# Patient Record
Sex: MALE | Race: WHITE | Employment: OTHER | URBAN - METROPOLITAN AREA
[De-identification: names, ages, dates, MRNs, and addresses within clinical notes are randomized per-mention and may not be internally consistent; named-entity substitution may affect disease eponyms.]

---

## 2017-01-06 NOTE — PROGRESS NOTES
SUBJECTIVE:                                                    Bhaskar Carrillo is a 76 year old male who presents to clinic today for the following health issues:      Hyperlipidemia Follow-Up-- he refuses meds due to side effects, he does not want to try anything else if there is even a remote chance of same side effects .      Rate your low fat/cholesterol diet?: fair    Taking statin?  No    Other lipid medications/supplements?:  none     COPD Follow-Up    Symptoms are currently: stable, is not needing that as much anymore, uses it 3-4 times daily now still declining other inhalers     Current fatigue or dyspnea with ambulation: stable     Shortness of breath: stable    Increased or change in Cough/Sputum: No    Fever(s): No    Baseline ambulation without stopping to rest 2-3 feet, rooms. Able to walk up 0 flights of stairs without stopping to rest.    Any ER/UC or hospital admissions since your last visit? No     History   Smoking status     Light Tobacco Smoker -- 50 years     Last Attempt to Quit: 03/12/2009   Smokeless tobacco     Never Used     FEV1        1.45   1/13/2014  [id number]       88   1/13/2014     Chronic Kidney Disease Follow-up      Current NSAID use?  No      Amount of exercise or physical activity: None    Problems taking medications regularly: No    Medication side effects: none    Diet: regular (no restrictions)        Problem list and histories reviewed & adjusted, as indicated.  Additional history: as documented    BP Readings from Last 3 Encounters:   01/10/17 128/70   07/12/16 134/76   01/14/16 138/76    Wt Readings from Last 3 Encounters:   01/10/17 190 lb 11.2 oz (86.501 kg)   07/12/16 194 lb (87.998 kg)   01/14/16 191 lb (86.637 kg)                  Labs reviewed in EPIC    ROS:  CONSTITUTIONAL:he lost some weight but then gained it right back he is now sure why he lost it in the first place  E/M: NEGATIVE for ear, mouth and throat problems  RESP:COPD stable  CV: NEGATIVE for chest  "pain, palpitations or peripheral edema  GI: NEGATIVE for nausea, abdominal pain or change in bowel habits and he gets heartburn after eating eggs every morning, it goes away with a chewable antacid  MUSCULOSKELETAL: back pain, not interested in referrals , testing or PT  PSYCHIATRIC: NEGATIVE for changes in mood or affect    OBJECTIVE:                                                    /70 mmHg  Pulse 74  Temp(Src) 98.4  F (36.9  C) (Oral)  Resp 16  Ht 5' 5.25\" (1.657 m)  Wt 190 lb 11.2 oz (86.501 kg)  BMI 31.50 kg/m2  Body mass index is 31.5 kg/(m^2).  GENERAL: healthy, alert and no distress  NECK: no adenopathy, no asymmetry, masses, or scars and thyroid normal to palpation  RESP: lungs clear to auscultation - no rales, rhonchi or wheezes  CV: regular rate and rhythm, normal S1 S2, no S3 or S4, no murmur, click or rub, no peripheral edema and peripheral pulses strong  ABDOMEN: declines exam today  MS: gets on exam table with difficulty today declines back exam  PSYCH: mentation appears normal, affect normal/bright    Diagnostic Test Results:  Results for orders placed or performed in visit on 07/12/16   BASIC METABOLIC PANEL   Result Value Ref Range    Sodium 137 133 - 144 mmol/L    Potassium 4.2 3.4 - 5.3 mmol/L    Chloride 103 94 - 109 mmol/L    Carbon Dioxide 27 20 - 32 mmol/L    Anion Gap 7 3 - 14 mmol/L    Glucose 114 (H) 70 - 99 mg/dL    Urea Nitrogen 21 7 - 30 mg/dL    Creatinine 1.16 0.66 - 1.25 mg/dL    GFR Estimate 62 >60 mL/min/1.7m2    GFR Estimate If Black 74 >60 mL/min/1.7m2    Calcium 9.4 8.5 - 10.1 mg/dL   Hemoglobin   Result Value Ref Range    Hemoglobin 15.8 13.3 - 17.7 g/dL        ASSESSMENT/PLAN:                                                        1. Essential hypertension with goal blood pressure less than 140/90  At goal, continue current meds   - hydrochlorothiazide (HYDRODIURIL) 25 MG tablet; Take 1 tablet (25 mg) by mouth every morning  Dispense: 90 tablet; Refill: 1    2. " CKD (chronic kidney disease) stage 3, GFR 30-59 ml/min  Stable avoid NSAIDS  - lisinopril (PRINIVIL/ZESTRIL) 40 MG tablet; Take 1 tablet (40 mg) by mouth daily  Dispense: 90 tablet; Refill: 1    3. Chronic obstructive pulmonary disease, unspecified COPD type (H)  Stable, declines additional inhalers   - albuterol (PROAIR HFA/PROVENTIL HFA/VENTOLIN HFA) 108 (90 BASE) MCG/ACT Inhaler; Inhale 2 puffs into the lungs every 4 hours as needed for shortness of breath / dyspnea  Dispense: 3 Inhaler; Refill: prn    4. Screen for colon cancer  declines    5. Tobacco use disorder  Light smoker considering stopping     6. Need for prophylactic vaccination and inoculation against influenza  declines    7. Need for prophylactic vaccination against Streptococcus pneumoniae (pneumococcus)  Declines     8. Need for prophylactic vaccination with tetanus-diphtheria (TD)  Declines       Recheck in 6 months     Ramona An PA-C  [facility]  Electronically signed by Ramona An PA-C

## 2017-01-10 ENCOUNTER — OFFICE VISIT (OUTPATIENT)
Dept: FAMILY MEDICINE | Facility: OTHER | Age: 77
End: 2017-01-10
Payer: COMMERCIAL

## 2017-01-10 VITALS
HEIGHT: 65 IN | BODY MASS INDEX: 31.77 KG/M2 | WEIGHT: 190.7 LBS | DIASTOLIC BLOOD PRESSURE: 70 MMHG | RESPIRATION RATE: 16 BRPM | SYSTOLIC BLOOD PRESSURE: 128 MMHG | TEMPERATURE: 98.4 F | HEART RATE: 74 BPM

## 2017-01-10 DIAGNOSIS — J44.9 CHRONIC OBSTRUCTIVE PULMONARY DISEASE, UNSPECIFIED COPD TYPE (H): ICD-10-CM

## 2017-01-10 DIAGNOSIS — Z23 NEED FOR PROPHYLACTIC VACCINATION AND INOCULATION AGAINST INFLUENZA: ICD-10-CM

## 2017-01-10 DIAGNOSIS — F17.200 TOBACCO USE DISORDER: ICD-10-CM

## 2017-01-10 DIAGNOSIS — I10 ESSENTIAL HYPERTENSION WITH GOAL BLOOD PRESSURE LESS THAN 140/90: ICD-10-CM

## 2017-01-10 DIAGNOSIS — Z23 NEED FOR PROPHYLACTIC VACCINATION WITH TETANUS-DIPHTHERIA (TD): ICD-10-CM

## 2017-01-10 DIAGNOSIS — Z12.11 SCREEN FOR COLON CANCER: Primary | ICD-10-CM

## 2017-01-10 DIAGNOSIS — Z23 NEED FOR PROPHYLACTIC VACCINATION AGAINST STREPTOCOCCUS PNEUMONIAE (PNEUMOCOCCUS): ICD-10-CM

## 2017-01-10 DIAGNOSIS — N18.30 CKD (CHRONIC KIDNEY DISEASE) STAGE 3, GFR 30-59 ML/MIN (H): ICD-10-CM

## 2017-01-10 PROCEDURE — 99213 OFFICE O/P EST LOW 20 MIN: CPT | Performed by: PHYSICIAN ASSISTANT

## 2017-01-10 RX ORDER — ALBUTEROL SULFATE 90 UG/1
2 AEROSOL, METERED RESPIRATORY (INHALATION) EVERY 4 HOURS PRN
Qty: 3 INHALER | Status: SHIPPED | OUTPATIENT
Start: 2017-01-10 | End: 2017-07-25

## 2017-01-10 RX ORDER — LISINOPRIL 40 MG/1
40 TABLET ORAL DAILY
Qty: 90 TABLET | Refills: 1 | Status: SHIPPED | OUTPATIENT
Start: 2017-01-10 | End: 2017-07-25

## 2017-01-10 RX ORDER — HYDROCHLOROTHIAZIDE 25 MG/1
25 TABLET ORAL EVERY MORNING
Qty: 90 TABLET | Refills: 1 | Status: SHIPPED | OUTPATIENT
Start: 2017-01-10 | End: 2017-07-25

## 2017-01-10 ASSESSMENT — PAIN SCALES - GENERAL: PAINLEVEL: WORST PAIN (10)

## 2017-01-10 NOTE — MR AVS SNAPSHOT
"              After Visit Summary   1/10/2017    Bhaskar Carrillo    MRN: [id number]           Patient Information     Date Of Birth          1940        Visit Information        Provider Department      1/10/2017 8:45 AM Ramona An PA-C [facility]        Today's Diagnoses     Screen for colon cancer    -  1     Essential hypertension with goal blood pressure less than 140/90         CKD (chronic kidney disease) stage 3, GFR 30-59 ml/min         Chronic obstructive pulmonary disease, unspecified COPD type (H)         Tobacco use disorder         Need for prophylactic vaccination and inoculation against influenza         Need for prophylactic vaccination against Streptococcus pneumoniae (pneumococcus)         Need for prophylactic vaccination with tetanus-diphtheria (TD)            Follow-ups after your visit        Who to contact     If you have questions or need follow up information about today's clinic visit or your schedule please contact [facility] directly at [phone].  Normal or non-critical lab and imaging results will be communicated to you by MyChart, letter or phone within 4 business days after the clinic has received the results. If you do not hear from us within 7 days, please contact the clinic through [email or web address] or phone. If you have a critical or abnormal lab result, we will notify you by phone as soon as possible.  Submit refill requests through [employer] or call your pharmacy and they will forward the refill request to us. Please allow 3 business days for your refill to be completed.          Additional Information About Your Visit        MyChart Information     [employer] lets you send messages to your doctor, view your test results, renew your prescriptions, schedule appointments and more. To sign up, go to [email or web address] . Click on \"Log in\" on the left side of the screen, which will take you to the Welcome page. Then click on \"Sign up Now\" on the " "right side of the page.     You will be asked to enter the access code listed below, as well as some personal information. Please follow the directions to create your username and password.     Your access code is: [email or web address]  Expires: 4/10/2017  9:12 AM     Your access code will  in 90 days. If you need help or a new code, please call your [facility] or [phone].        Care EveryWhere ID     This is your Care EveryWhere ID. This could be used by other organizations to access your [facility] medical records  [id number]        Your Vitals Were     Pulse Temperature Respirations Height BMI (Body Mass Index)       74 98.4  F (36.9  C) (Oral) 16 5' 5.25\" (1.657 m) 31.50 kg/m2        Blood Pressure from Last 3 Encounters:   01/10/17 128/70   16 134/76   16 138/76    Weight from Last 3 Encounters:   01/10/17 190 lb 11.2 oz (86.501 kg)   16 194 lb (87.998 kg)   16 191 lb (86.637 kg)              Today, you had the following     No orders found for display         Where to get your medicines      These medications were sent to [facility] - [place], [place] - [address]  [address], [facility] [address]     Phone:  [phone]    - albuterol 108 (90 BASE) MCG/ACT Inhaler  - hydrochlorothiazide 25 MG tablet  - lisinopril 40 MG tablet       Primary Care Provider Office Phone # Fax #    Ramona An PA-C [phone]       [facility] [address] [place] [address]        Thank you!     Thank you for choosing [facility]  for your care. Our goal is always to provide you with excellent care. Hearing back from our patients is one way we can continue to improve our services. Please take a few minutes to complete the written survey that you may receive in the mail after your visit with us. Thank you!             Your Updated Medication List - Protect others around you: Learn how to safely use, store and throw away your medicines at " www.disposemymeds.org.          This list is accurate as of: 1/10/17  9:12 AM.  Always use your most recent med list.                   Brand Name Dispense Instructions for use    albuterol 108 (90 BASE) MCG/ACT Inhaler    PROAIR HFA/PROVENTIL HFA/VENTOLIN HFA    3 Inhaler    Inhale 2 puffs into the lungs every 4 hours as needed for shortness of breath / dyspnea       hydrochlorothiazide 25 MG tablet    HYDRODIURIL    90 tablet    Take 1 tablet (25 mg) by mouth every morning       lisinopril 40 MG tablet    PRINIVIL/ZESTRIL    90 tablet    Take 1 tablet (40 mg) by mouth daily       STATIN NOT PRESCRIBED (INTENTIONAL)      by Other route continuous prn.

## 2017-01-10 NOTE — NURSING NOTE
"Chief Complaint   Patient presents with     Lipids     COPD     Chronic Disease Management     Panel Management     Tdap, Prevnar, Flu, CC screen, Honoring Choices, AA screen, Tobacco Use, Lipids, Microalbumin, COPD AP       Initial /70 mmHg  Pulse 74  Temp(Src) 98.4  F (36.9  C) (Oral)  Resp 16  Ht 5' 5.25\" (1.657 m)  Wt 190 lb 11.2 oz (86.501 kg)  BMI 31.50 kg/m2 Estimated body mass index is 31.5 kg/(m^2) as calculated from the following:    Height as of this encounter: 5' 5.25\" (1.657 m).    Weight as of this encounter: 190 lb 11.2 oz (86.501 kg).  BP completed using cuff size: regular    Kallie Manning MA    "

## 2017-07-21 NOTE — PROGRESS NOTES
"  SUBJECTIVE:                                                    Bhaskar Carrillo is a 76 year old male who presents to clinic today for the following health issues:  He is losing weight due to eating less, he has given up his marin, sausage and eggs daily.    Hyperlipidemia Follow-Up-- he does not want to check his lipids as he is \"never\" is going to take a lipid lowering medication.+      Rate your low fat/cholesterol diet?: good    Taking statin?  No    Other lipid medications/supplements?:  none    Hypertension Follow-up      Outpatient blood pressures are being checked at store.  Results are patient is unsure of results.    Low Salt Diet: no added salt    COPD Follow-Up- he has also mostly quit smoking, He has noticed improvement with his breathing due to weight loss as well as left smoking he does not wish to try other inhalers for his COPD as we have discussed in the past. He has cut \"way down\" on his use of the albuterol only using 3-5 times per day now  He has also moved into town, which has been helpful as he has been getting more exercise    Symptoms are currently: stable    Current fatigue or dyspnea with ambulation: stable     Shortness of breath: stable    Increased or change in Cough/Sputum: No    Fever(s): No    Baseline ambulation without stopping to rest 1/2 blocks. Able to walk up 1 flights of stairs without stopping to rest.    Any ER/UC or hospital admissions since your last visit? No     History   Smoking Status     Light Tobacco Smoker     Years: 50.00     Last attempt to quit: 3/12/2009   Smokeless Tobacco     Never Used     Lab Results   Component Value Date    FEV1 1.45 01/13/2014    MNK5MMX 88 01/13/2014     Chronic Kidney Disease Follow-up      Current NSAID use?  No        Amount of exercise or physical activity: None    Problems taking medications regularly: No    Medication side effects: none  Diet: regular (no restrictions)      Advance Care Planning 7/21/2017: ACP Review of Chart / " "Resources Provided:  Reviewed chart for advance care plan.  Bhaskar Carrillo has Advanced care directive as part of his will, he states his son has a copy he does not believe we need copy  Added by Ramona An      Problem list and histories reviewed & adjusted, as indicated.  Additional history: as documented    BP Readings from Last 3 Encounters:   07/25/17 126/74   01/10/17 128/70   07/12/16 134/76    Wt Readings from Last 3 Encounters:   07/25/17 181 lb 4.8 oz (82.2 kg)   01/10/17 190 lb 11.2 oz (86.5 kg)   07/12/16 194 lb (88 kg)               Reviewed and updated as needed this visit by clinical staff     Reviewed and updated as needed this visit by Provider         ROS:  C: NEGATIVE for fever, chills, change in weight  E/M: NEGATIVE for ear, mouth and throat problems  R: NEGATIVE for significant cough or SOB  CV: NEGATIVE for chest pain, palpitations or peripheral edema  GI: NEGATIVE for nausea, abdominal pain, heartburn, or change in bowel habits  PSYCHIATRIC: NEGATIVE for changes in mood or affect    OBJECTIVE:     /74  Pulse 74  Temp 97.8  F (36.6  C) (Temporal)  Resp 14  Ht 5' 5.25\" (1.657 m)  Wt 181 lb 4.8 oz (82.2 kg)  SpO2 98%  BMI 29.94 kg/m2  Body mass index is 29.94 kg/(m^2).  GENERAL: healthy, alert and no distress  NECK: no adenopathy, no asymmetry, masses, or scars and thyroid normal to palpation  RESP: lungs clear to auscultation - no rales, rhonchi or wheezes  CV: regular rate and rhythm, normal S1 S2, no S3 or S4, no murmur, click or rub, no peripheral edema and peripheral pulses strong  ABDOMEN: soft, nontender, no hepatosplenomegaly, no masses and bowel sounds normal  MS: no gross musculoskeletal defects noted, no edema  PSYCH: mentation appears normal, affect normal/bright    Diagnostic Test Results:  No results found for this or any previous visit (from the past 24 hour(s)).    ASSESSMENT/PLAN:             1. Essential hypertension with goal blood pressure less than " 140/90  At goal, continue current meds weight loss certainly has been helpful  - BASIC METABOLIC PANEL  - HONORING CHOICES REFERRAL  - hydrochlorothiazide (HYDRODIURIL) 25 MG tablet; Take 1 tablet (25 mg) by mouth every morning  Dispense: 90 tablet; Refill: 1    2. CKD (chronic kidney disease) stage 3, GFR 30-59 ml/min  Await results  - BASIC METABOLIC PANEL  - lisinopril (PRINIVIL/ZESTRIL) 40 MG tablet; Take 1 tablet (40 mg) by mouth daily  Dispense: 90 tablet; Refill: 1    3. Chronic obstructive pulmonary disease, unspecified COPD type (H)  Improved per patientOnce again offered other inhalers for COPD which he declined  - albuterol (PROAIR HFA/PROVENTIL HFA/VENTOLIN HFA) 108 (90 BASE) MCG/ACT Inhaler; Inhale 2 puffs into the lungs every 4 hours as needed for shortness of breath / dyspnea  Dispense: 3 Inhaler; Refill: prn    4. Tobacco use disorder  Nearly quit    5. Need for prophylactic vaccination against Streptococcus pneumoniae (pneumococcus)  Declined    6. Need for prophylactic vaccination with tetanus-diphtheria (TD)  Declined    7. Screen for colon cancer  Declined          Ramona An PA-C  [facility] to been seen annually Per my discussion with patient, he really only wants to come in once per year, he will see float nurse for bp recheck In 6 months, he will come in sooner if he is having any issues    Electronically signed by Ramona An PA-C

## 2017-07-25 ENCOUNTER — OFFICE VISIT (OUTPATIENT)
Dept: FAMILY MEDICINE | Facility: OTHER | Age: 77
End: 2017-07-25
Payer: COMMERCIAL

## 2017-07-25 VITALS
RESPIRATION RATE: 14 BRPM | WEIGHT: 181.3 LBS | HEART RATE: 74 BPM | DIASTOLIC BLOOD PRESSURE: 74 MMHG | OXYGEN SATURATION: 98 % | HEIGHT: 65 IN | SYSTOLIC BLOOD PRESSURE: 126 MMHG | TEMPERATURE: 97.8 F | BODY MASS INDEX: 30.21 KG/M2

## 2017-07-25 DIAGNOSIS — Z23 NEED FOR PROPHYLACTIC VACCINATION WITH TETANUS-DIPHTHERIA (TD): ICD-10-CM

## 2017-07-25 DIAGNOSIS — F17.200 TOBACCO USE DISORDER: ICD-10-CM

## 2017-07-25 DIAGNOSIS — I10 ESSENTIAL HYPERTENSION WITH GOAL BLOOD PRESSURE LESS THAN 140/90: ICD-10-CM

## 2017-07-25 DIAGNOSIS — Z23 NEED FOR PROPHYLACTIC VACCINATION AGAINST STREPTOCOCCUS PNEUMONIAE (PNEUMOCOCCUS): ICD-10-CM

## 2017-07-25 DIAGNOSIS — J44.9 CHRONIC OBSTRUCTIVE PULMONARY DISEASE, UNSPECIFIED COPD TYPE (H): ICD-10-CM

## 2017-07-25 DIAGNOSIS — Z12.11 SCREEN FOR COLON CANCER: ICD-10-CM

## 2017-07-25 DIAGNOSIS — N18.30 CKD (CHRONIC KIDNEY DISEASE) STAGE 3, GFR 30-59 ML/MIN (H): ICD-10-CM

## 2017-07-25 LAB
ANION GAP SERPL CALCULATED.3IONS-SCNC: 8 MMOL/L (ref 3–14)
BUN SERPL-MCNC: 18 MG/DL (ref 7–30)
CALCIUM SERPL-MCNC: 9.3 MG/DL (ref 8.5–10.1)
CHLORIDE SERPL-SCNC: 108 MMOL/L (ref 94–109)
CO2 SERPL-SCNC: 25 MMOL/L (ref 20–32)
CREAT SERPL-MCNC: 1.25 MG/DL (ref 0.66–1.25)
GFR SERPL CREATININE-BSD FRML MDRD: 56 ML/MIN/1.7M2
GLUCOSE SERPL-MCNC: 95 MG/DL (ref 70–99)
POTASSIUM SERPL-SCNC: 4.8 MMOL/L (ref 3.4–5.3)
SODIUM SERPL-SCNC: 141 MMOL/L (ref 133–144)

## 2017-07-25 PROCEDURE — 99214 OFFICE O/P EST MOD 30 MIN: CPT | Performed by: PHYSICIAN ASSISTANT

## 2017-07-25 PROCEDURE — 80048 BASIC METABOLIC PNL TOTAL CA: CPT | Performed by: PHYSICIAN ASSISTANT

## 2017-07-25 PROCEDURE — 36415 COLL VENOUS BLD VENIPUNCTURE: CPT | Performed by: PHYSICIAN ASSISTANT

## 2017-07-25 RX ORDER — LISINOPRIL 40 MG/1
40 TABLET ORAL DAILY
Qty: 90 TABLET | Refills: 1 | Status: SHIPPED | OUTPATIENT
Start: 2017-07-25 | End: 2018-01-09

## 2017-07-25 RX ORDER — HYDROCHLOROTHIAZIDE 25 MG/1
25 TABLET ORAL EVERY MORNING
Qty: 90 TABLET | Refills: 1 | Status: SHIPPED | OUTPATIENT
Start: 2017-07-25 | End: 2018-01-09

## 2017-07-25 RX ORDER — ALBUTEROL SULFATE 90 UG/1
2 AEROSOL, METERED RESPIRATORY (INHALATION) EVERY 4 HOURS PRN
Qty: 3 INHALER | Status: SHIPPED | OUTPATIENT
Start: 2017-07-25 | End: 2018-01-09

## 2017-07-25 ASSESSMENT — PAIN SCALES - GENERAL: PAINLEVEL: EXTREME PAIN (8)

## 2017-07-25 NOTE — NURSING NOTE
"Chief Complaint   Patient presents with     Lipids     Hypertension     Chronic Disease Management     COPD     Panel Management     tdap, prevnar, aortic aneurysm screening, colon/fit, fall risk, advanced directives, tobacco cessation, lipids, bmp, microalbumin, hgb, copd action plan       Initial /74  Pulse 74  Temp 97.8  F (36.6  C) (Temporal)  Resp 14  Ht 5' 5.25\" (1.657 m)  Wt 181 lb 4.8 oz (82.2 kg)  SpO2 98%  BMI 29.94 kg/m2 Estimated body mass index is 29.94 kg/(m^2) as calculated from the following:    Height as of this encounter: 5' 5.25\" (1.657 m).    Weight as of this encounter: 181 lb 4.8 oz (82.2 kg).  Medication Reconciliation: complete     Kallie Manning MA    "

## 2017-07-25 NOTE — LETTER
Bhaskar Carrillo  [address]   [facility] [address]        July 26, 2017      Dear ,    We are writing to inform you of your test results. Your kidney functions are improved though still filtering a little bit slowly.  We will continue to check this annually.  Your electrolytes are normal. Your blood sugar is now normal as well, your weight loss has improved this as well.  Nice to see you yesterday, see you in 1 year.     Resulted Orders   BASIC METABOLIC PANEL   Result Value Ref Range    Sodium 141 133 - 144 mmol/L    Potassium 4.8 3.4 - 5.3 mmol/L    Chloride 108 94 - 109 mmol/L    Carbon Dioxide 25 20 - 32 mmol/L    Anion Gap 8 3 - 14 mmol/L    Glucose 95 70 - 99 mg/dL    Urea Nitrogen 18 7 - 30 mg/dL    Creatinine 1.25 0.66 - 1.25 mg/dL    GFR Estimate 56 (L) >60 mL/min/1.7m2      Comment:      Non  GFR Calc    GFR Estimate If Black 68 >60 mL/min/1.7m2      Comment:       GFR Calc    Calcium 9.3 8.5 - 10.1 mg/dL     If you have any questions or concerns, please call the clinic at the number listed above.       Sincerely,      Ramona An PA-C

## 2017-07-25 NOTE — MR AVS SNAPSHOT
After Visit Summary   7/25/2017    Bhaskar Carrillo    MRN: [id number]           Patient Information     Date Of Birth          1940        Visit Information        Provider Department      7/25/2017 10:00 AM Ramona An PA-C [facility]        Today's Diagnoses     Essential hypertension with goal blood pressure less than 140/90        CKD (chronic kidney disease) stage 3, GFR 30-59 ml/min        Chronic obstructive pulmonary disease, unspecified COPD type (H)        Tobacco use disorder        Need for prophylactic vaccination against Streptococcus pneumoniae (pneumococcus)        Need for prophylactic vaccination with tetanus-diphtheria (TD)        Screen for colon cancer           Follow-ups after your visit        Additional Services     HONORING [facility] REFERRAL       Your provider has referred you to [facility] Advance Care Planning Facilitator or Serious illness clinic support staff. The facilitator or support staff will contact you to schedule the appointment or for the follow up call    Reason for Referral: pt already has one, does not need help and is declining giving us a copy                  Who to contact     If you have questions or need follow up information about today's clinic visit or your schedule please contact [facility] directly at [phone].  Normal or non-critical lab and imaging results will be communicated to you by MyChart, letter or phone within 4 business days after the clinic has received the results. If you do not hear from us within 7 days, please contact the clinic through MyChart or phone. If you have a critical or abnormal lab result, we will notify you by phone as soon as possible.  Submit refill requests through [employer] or call your pharmacy and they will forward the refill request to us. Please allow 3 business days for your refill to be completed.          Additional Information About Your Visit       "  MyChart Information     [employer] lets you send messages to your doctor, view your test results, renew your prescriptions, schedule appointments and more. To sign up, go to [email or web address] . Click on \"Log in\" on the left side of the screen, which will take you to the Welcome page. Then click on \"Sign up Now\" on the right side of the page.     You will be asked to enter the access code listed below, as well as some personal information. Please follow the directions to create your username and password.     Your access code is: [email or web address]  Expires: 10/23/2017 10:43 AM     Your access code will  in 90 days. If you need help or a new code, please call your [facility] clinic or [phone].        Care EveryWhere ID     This is your Care EveryWhere ID. This could be used by other organizations to access your [facility] medical records  [id number]        Your Vitals Were     Pulse Temperature Respirations Height Pulse Oximetry BMI (Body Mass Index)    74 97.8  F (36.6  C) (Temporal) 14 5' 5.25\" (1.657 m) 98% 29.94 kg/m2       Blood Pressure from Last 3 Encounters:   17 126/74   01/10/17 128/70   16 134/76    Weight from Last 3 Encounters:   17 181 lb 4.8 oz (82.2 kg)   01/10/17 190 lb 11.2 oz (86.5 kg)   16 194 lb (88 kg)              We Performed the Following     BASIC METABOLIC PANEL     HONORING CHOICES REFERRAL          Where to get your medicines      These medications were [facility] to [facility] - [place], [place] - [address]  [address], [facility] [address]     Phone:  [phone]     albuterol 108 (90 BASE) MCG/ACT Inhaler    hydrochlorothiazide 25 MG tablet    lisinopril 40 MG tablet          Primary Care Provider Office Phone # Fax #    Ramona An PA-C [phone]       [facility] [address] [place] [address]        Equal Access to Services     CHUCK BAEZ AH: Vita Gomez elena guerra, sydnee fajardo " mariah saxenalinda donyasamaria maynardaan ah. Brandy [facility] [phone].    ATENCIÓN: Si habla bhavani, tiene a knapp disposición servicios gratuitos de asistencia lingüística. Oliver al [phone].    We comply with applicable federal civil rights laws and [place] laws. We do not discriminate on the basis of race, color, national origin, age, disability sex, sexual orientation or gender identity.            Thank you!     Thank you for choosing [facility]  for your care. Our goal is always to provide you with excellent care. Hearing back from our patients is one way we can continue to improve our services. Please take a few minutes to complete the written survey that you may receive in the mail after your visit with us. Thank you!             Your Updated Medication List - Protect others around you: Learn how to safely use, store and throw away your medicines at www.disposemymeds.org.          This list is accurate as of: 7/25/17 10:43 AM.  Always use your most recent med list.                   Brand Name Dispense Instructions for use Diagnosis    albuterol 108 (90 BASE) MCG/ACT Inhaler    PROAIR HFA/PROVENTIL HFA/VENTOLIN HFA    3 Inhaler    Inhale 2 puffs into the lungs every 4 hours as needed for shortness of breath / dyspnea    Chronic obstructive pulmonary disease, unspecified COPD type (H)       hydrochlorothiazide 25 MG tablet    HYDRODIURIL    90 tablet    Take 1 tablet (25 mg) by mouth every morning    Essential hypertension with goal blood pressure less than 140/90       lisinopril 40 MG tablet    PRINIVIL/ZESTRIL    90 tablet    Take 1 tablet (40 mg) by mouth daily    CKD (chronic kidney disease) stage 3, GFR 30-59 ml/min       STATIN NOT PRESCRIBED (INTENTIONAL)      by Other route continuous prn.    Hyperlipidemia LDL goal < 130

## 2018-01-01 ENCOUNTER — OFFICE VISIT (OUTPATIENT)
Dept: FAMILY MEDICINE | Facility: OTHER | Age: 78
End: 2018-01-01
Payer: COMMERCIAL

## 2018-01-01 ENCOUNTER — ALLIED HEALTH/NURSE VISIT (OUTPATIENT)
Dept: FAMILY MEDICINE | Facility: OTHER | Age: 78
End: 2018-01-01
Payer: COMMERCIAL

## 2018-01-01 VITALS
DIASTOLIC BLOOD PRESSURE: 64 MMHG | WEIGHT: 189 LBS | TEMPERATURE: 97.8 F | HEIGHT: 66 IN | HEART RATE: 72 BPM | BODY MASS INDEX: 30.37 KG/M2 | OXYGEN SATURATION: 97 % | RESPIRATION RATE: 16 BRPM | SYSTOLIC BLOOD PRESSURE: 136 MMHG

## 2018-01-01 VITALS — HEART RATE: 60 BPM | DIASTOLIC BLOOD PRESSURE: 70 MMHG | SYSTOLIC BLOOD PRESSURE: 136 MMHG

## 2018-01-01 DIAGNOSIS — Z23 NEED FOR PROPHYLACTIC VACCINATION AGAINST STREPTOCOCCUS PNEUMONIAE (PNEUMOCOCCUS): ICD-10-CM

## 2018-01-01 DIAGNOSIS — Z01.30 BP CHECK: Primary | ICD-10-CM

## 2018-01-01 DIAGNOSIS — F17.200 TOBACCO USE DISORDER: ICD-10-CM

## 2018-01-01 DIAGNOSIS — J44.9 CHRONIC OBSTRUCTIVE PULMONARY DISEASE, UNSPECIFIED COPD TYPE (H): ICD-10-CM

## 2018-01-01 DIAGNOSIS — I10 HYPERTENSION GOAL BP (BLOOD PRESSURE) < 140/90: Primary | ICD-10-CM

## 2018-01-01 DIAGNOSIS — Z12.11 SCREEN FOR COLON CANCER: ICD-10-CM

## 2018-01-01 DIAGNOSIS — I10 ESSENTIAL HYPERTENSION WITH GOAL BLOOD PRESSURE LESS THAN 140/90: ICD-10-CM

## 2018-01-01 DIAGNOSIS — Z23 NEED FOR PROPHYLACTIC VACCINATION WITH TETANUS-DIPHTHERIA (TD): ICD-10-CM

## 2018-01-01 DIAGNOSIS — N18.30 CKD (CHRONIC KIDNEY DISEASE) STAGE 3, GFR 30-59 ML/MIN (H): ICD-10-CM

## 2018-01-01 LAB
ANION GAP SERPL CALCULATED.3IONS-SCNC: 9 MMOL/L (ref 3–14)
BUN SERPL-MCNC: 21 MG/DL (ref 7–30)
CALCIUM SERPL-MCNC: 9.5 MG/DL (ref 8.5–10.1)
CHLORIDE SERPL-SCNC: 106 MMOL/L (ref 94–109)
CO2 SERPL-SCNC: 25 MMOL/L (ref 20–32)
CREAT SERPL-MCNC: 1.44 MG/DL (ref 0.66–1.25)
GFR SERPL CREATININE-BSD FRML MDRD: 48 ML/MIN/1.7M2
GLUCOSE SERPL-MCNC: 115 MG/DL (ref 70–99)
HGB BLD-MCNC: 14.2 G/DL (ref 13.3–17.7)
POTASSIUM SERPL-SCNC: 4.3 MMOL/L (ref 3.4–5.3)
SODIUM SERPL-SCNC: 140 MMOL/L (ref 133–144)

## 2018-01-01 PROCEDURE — 80048 BASIC METABOLIC PNL TOTAL CA: CPT | Performed by: PHYSICIAN ASSISTANT

## 2018-01-01 PROCEDURE — 99214 OFFICE O/P EST MOD 30 MIN: CPT | Performed by: PHYSICIAN ASSISTANT

## 2018-01-01 PROCEDURE — 36415 COLL VENOUS BLD VENIPUNCTURE: CPT | Performed by: PHYSICIAN ASSISTANT

## 2018-01-01 PROCEDURE — 99207 ZZC NO CHARGE NURSE ONLY: CPT

## 2018-01-01 PROCEDURE — 85018 HEMOGLOBIN: CPT | Performed by: PHYSICIAN ASSISTANT

## 2018-01-01 RX ORDER — LISINOPRIL 20 MG/1
20 TABLET ORAL DAILY
Qty: 90 TABLET | Refills: 3 | Status: ON HOLD | OUTPATIENT
Start: 2018-01-01 | End: 2019-01-01

## 2018-01-01 RX ORDER — HYDROCHLOROTHIAZIDE 25 MG/1
25 TABLET ORAL EVERY MORNING
Qty: 90 TABLET | Refills: 3 | Status: ON HOLD | OUTPATIENT
Start: 2018-01-01 | End: 2019-01-01

## 2018-01-01 ASSESSMENT — PAIN SCALES - GENERAL: PAINLEVEL: MODERATE PAIN (4)

## 2018-01-09 ENCOUNTER — ALLIED HEALTH/NURSE VISIT (OUTPATIENT)
Dept: FAMILY MEDICINE | Facility: OTHER | Age: 78
End: 2018-01-09
Payer: COMMERCIAL

## 2018-01-09 VITALS — DIASTOLIC BLOOD PRESSURE: 70 MMHG | HEART RATE: 100 BPM | SYSTOLIC BLOOD PRESSURE: 122 MMHG

## 2018-01-09 DIAGNOSIS — I10 ESSENTIAL HYPERTENSION WITH GOAL BLOOD PRESSURE LESS THAN 140/90: ICD-10-CM

## 2018-01-09 DIAGNOSIS — I10 HYPERTENSION GOAL BP (BLOOD PRESSURE) < 140/90: Primary | ICD-10-CM

## 2018-01-09 DIAGNOSIS — N18.30 CKD (CHRONIC KIDNEY DISEASE) STAGE 3, GFR 30-59 ML/MIN (H): ICD-10-CM

## 2018-01-09 DIAGNOSIS — J44.9 CHRONIC OBSTRUCTIVE PULMONARY DISEASE, UNSPECIFIED COPD TYPE (H): ICD-10-CM

## 2018-01-09 PROCEDURE — 99207 ZZC NO CHARGE NURSE ONLY: CPT

## 2018-01-09 RX ORDER — HYDROCHLOROTHIAZIDE 25 MG/1
25 TABLET ORAL EVERY MORNING
Qty: 90 TABLET | Refills: 1 | Status: SHIPPED | OUTPATIENT
Start: 2018-01-09 | End: 2018-01-01

## 2018-01-09 RX ORDER — ALBUTEROL SULFATE 90 UG/1
2 AEROSOL, METERED RESPIRATORY (INHALATION) EVERY 4 HOURS PRN
Qty: 3 INHALER | Status: SHIPPED | OUTPATIENT
Start: 2018-01-09 | End: 2019-01-01

## 2018-01-09 RX ORDER — LISINOPRIL 40 MG/1
40 TABLET ORAL DAILY
Qty: 90 TABLET | Refills: 1 | Status: ON HOLD | OUTPATIENT
Start: 2018-01-09 | End: 2019-01-01

## 2018-01-09 NOTE — PROGRESS NOTES
Please call pt, nice looking blood pressure, I have refilled his meds for him, I will see him in the summer, sooner if he is not doing well  Ramona An PA-C

## 2018-01-09 NOTE — NURSING NOTE
Bhaskar Carrillo is a 77 year old male who comes in today for a Blood Pressure check because of ongoing blood pressure monitoring.    *Document pulse and BP  *Use new set of vitals button for multiple readings.  *Use extended vitals for orthostatic    Vitals as recorded, a large cuff was used.    Patient is taking medication as prescribed  Patient is tolerating medications well.  Patient is monitoring Blood Pressure at home.  Average readings if yes are 117/-140/70's    Current complaints: none    Disposition: follow-up as indicated by MD/AP and results routed to MD/AP

## 2018-01-09 NOTE — Clinical Note
Patient was in today, Stated he needs BP checked prior to refills. Patient requested refills of his medications. Pharmacy [facility] in [place]. Pat Craig CMA (MA)

## 2018-01-09 NOTE — MR AVS SNAPSHOT
"              After Visit Summary   2018    Bhaskar Carrillo    MRN: [id number]           Patient Information     Date Of Birth          1940        Visit Information        Provider Department      2018 11:30 AM EMELINA HSU NURSE [facility]        Today's Diagnoses     Hypertension goal BP (blood pressure) < 140/90    -  1       Follow-ups after your visit        Who to contact     If you have questions or need follow up information about today's clinic visit or your schedule please contact [facility] directly at [phone].  Normal or non-critical lab and imaging results will be communicated to you by AccuVeinhart, letter or phone within 4 business days after the clinic has received the results. If you do not hear from us within 7 days, please contact the clinic through Physcientt or phone. If you have a critical or abnormal lab result, we will notify you by phone as soon as possible.  Submit refill requests through My Single Point or call your pharmacy and they will forward the refill request to us. Please allow 3 business days for your refill to be completed.          Additional Information About Your Visit        MyChart Information     My Single Point lets you send messages to your doctor, view your test results, renew your prescriptions, schedule appointments and more. To sign up, go to [email or web address] . Click on \"Log in\" on the left side of the screen, which will take you to the Welcome page. Then click on \"Sign up Now\" on the right side of the page.     You will be asked to enter the access code listed below, as well as some personal information. Please follow the directions to create your username and password.     Your access code is: [email or web address]  Expires: 2018 11:53 AM     Your access code will  in 90 days. If you need help or a new code, please call your [facility] or [phone].        Care EveryWhere ID     This is your Care EveryWhere ID. This could be " used by other organizations to access your [place] medical records  [id number]        Your Vitals Were     Pulse                   100            Blood Pressure from Last 3 Encounters:   01/09/18 122/70   07/25/17 126/74   01/10/17 128/70    Weight from Last 3 Encounters:   07/25/17 181 lb 4.8 oz (82.2 kg)   01/10/17 190 lb 11.2 oz (86.5 kg)   07/12/16 194 lb (88 kg)              Today, you had the following     No orders found for display       Primary Care Provider Office Phone # Fax #    Ramona An PA-C [phone] [address] [place] [address]        Equal Access to Services     CHUCK BAEZ : Hadii maritza Gomez, warenzoda lunhiadaha, qaybta kaalmada adelindayachristian, mariah garcia. So [facility] [phone].    ATENCIÓN: Si habla español, tiene a knapp disposición servicios gratuitos de asistencia lingüística. Llame al [phone].    We comply with applicable federal civil rights laws and [place] laws. We do not discriminate on the basis of race, color, national origin, age, disability, sex, sexual orientation, or gender identity.            Thank you!     Thank you for choosing [facility]  for your care. Our goal is always to provide you with excellent care. Hearing back from our patients is one way we can continue to improve our services. Please take a few minutes to complete the written survey that you may receive in the mail after your visit with us. Thank you!             Your Updated Medication List - Protect others around you: Learn how to safely use, store and throw away your medicines at www.disposemymeds.org.          This list is accurate as of: 1/9/18 11:53 AM.  Always use your most recent med list.                   Brand Name Dispense Instructions for use Diagnosis    albuterol 108 (90 BASE) MCG/ACT Inhaler    PROAIR HFA/PROVENTIL HFA/VENTOLIN HFA    3 Inhaler    Inhale 2 puffs into the lungs every 4 hours as needed for  shortness of breath / dyspnea    Chronic obstructive pulmonary disease, unspecified COPD type (H)       hydrochlorothiazide 25 MG tablet    HYDRODIURIL    90 tablet    Take 1 tablet (25 mg) by mouth every morning    Essential hypertension with goal blood pressure less than 140/90       lisinopril 40 MG tablet    PRINIVIL/ZESTRIL    90 tablet    Take 1 tablet (40 mg) by mouth daily    CKD (chronic kidney disease) stage 3, GFR 30-59 ml/min       STATIN NOT PRESCRIBED (INTENTIONAL)      by Other route continuous prn.    Hyperlipidemia LDL goal < 130

## 2018-01-16 ENCOUNTER — TELEPHONE (OUTPATIENT)
Dept: FAMILY MEDICINE | Facility: OTHER | Age: 78
End: 2018-01-16

## 2018-07-05 NOTE — PROGRESS NOTES
"  SUBJECTIVE:   Bhaskar Carrillo is a 77 year old male who presents to clinic today for the following health issues:      History of Present Illness     CKD:     NSAID use::  None    Hyperlipidemia:     Low fat/chol diet rating::  Not monitoring fat    Taking Statins::  No    Lipid Medications or Supplements::  None    Hypertension:     Outpatient blood pressures:  Are being checked (He feels that his blood pressures are too low.  One time he checked his blood pressure was 97/66, he states he felt fine at the time.  He has purchased a new blood pressure cuff and has been monitoring)    Blood pressures checked at:  Home    Dietary sodium intake::  Low salt diet  Hypertension comment:  States his blood pressure has been \"all over the place\" and states he is not taking his medication every day, only takes it when his BP is 140 or more.  He has only been taking his blood pressure pills the lisinopril every other day and now he states his blood pressures are about right, usually in the 130s over 80s.    Diet:  Breakfast skipped and Low salt  Frequency of exercise:  None  Taking medications regularly:  Yes  Medication side effects:  None  Additional concerns today:  No      Problem list and histories reviewed & adjusted, as indicated.  Additional history: He feels that his breathing is stable, he does not wish to use any different inhalers, he states that his CO PD is well-controlled with as needed use of albuterol.  Hot humid weather is his worst weather for his breathing, he just tries to stay indoors in air conditioning when the temperature is like this.  He has still not entirely quit smoking though has cut down considerably.        BP Readings from Last 3 Encounters:   07/10/18 136/64   01/09/18 122/70   07/25/17 126/74    Wt Readings from Last 3 Encounters:   07/10/18 189 lb (85.7 kg)   07/25/17 181 lb 4.8 oz (82.2 kg)   01/10/17 190 lb 11.2 oz (86.5 kg)                  Labs reviewed in EPIC    ROS:  CONSTITUTIONAL: " "NEGATIVE for fever, chills, change in weight  ENT/MOUTH: NEGATIVE for ear, mouth and throat problems  RESP: NEGATIVE for significant cough or SOB  RESP: Stable COPD  CV: NEGATIVE for chest pain, palpitations or peripheral edema  GI: NEGATIVE for nausea, abdominal pain, heartburn, or change in bowel habits  MUSCULOSKELETAL: NEGATIVE for significant arthralgias or myalgia  PSYCHIATRIC: NEGATIVE for changes in mood or affect    OBJECTIVE:     /64  Pulse 72  Temp 97.8  F (36.6  C) (Temporal)  Resp 16  Ht 5' 5.87\" (1.673 m)  Wt 189 lb (85.7 kg)  SpO2 97%  BMI 30.63 kg/m2  Body mass index is 30.63 kg/(m^2).  GENERAL: healthy, alert and no distress  HENT: Patient is hard of hearing  NECK: no adenopathy, no asymmetry, masses, or scars and thyroid normal to palpation  RESP: lungs clear to auscultation - no rales, rhonchi or wheezes  CV: regular rate and rhythm, normal S1 S2, no S3 or S4, no murmur, click or rub, no peripheral edema and peripheral pulses strong  MS: no gross musculoskeletal defects noted, no edema  NEURO: Normal strength and tone, mentation intact and speech normal  PSYCH: mentation appears normal, affect normal/bright    Diagnostic Test Results:  No results found for this or any previous visit (from the past 24 hour(s)).    ASSESSMENT/PLAN:           He also declined AAA and lipids as he will never take any meds for this again per pt     1. CKD (chronic kidney disease) stage 3, GFR 30-59 ml/min  Await results, carefully monitor his blood pressure  - BASIC METABOLIC PANEL  - Hemoglobin  - lisinopril (PRINIVIL/ZESTRIL) 20 MG tablet; Take 1 tablet (20 mg) by mouth daily  Dispense: 90 tablet; Refill: 3    2. Hypertension goal BP (blood pressure) < 140/90  We will lower his lisinopril to 20 mg, he will bring in his blood pressure cuff for a float nurse blood pressure recheck in 2 weeks to ensure we are adequately controlling his blood pressure with his smaller dosage  - BASIC METABOLIC PANEL    3. " Essential hypertension with goal blood pressure less than 140/90  Continue with 1 pill daily  - hydrochlorothiazide (HYDRODIURIL) 25 MG tablet; Take 1 tablet (25 mg) by mouth every morning  Dispense: 90 tablet; Refill: 3    4. Chronic obstructive pulmonary disease, unspecified COPD type (H)  Stable, he has declined other inhalers, he does not need his albuterol refilled at this time though it is refillable for 1 year from now    5. Tobacco use disorder  Encourage complete cessation    6. Need for prophylactic vaccination against Streptococcus pneumoniae (pneumococcus)  Declined    7. Need for prophylactic vaccination with tetanus-diphtheria (TD)  Declined    8. Screen for colon cancer  Declined      This chart documentation was completed in part with Dragon voice recognition software.  Documentation is reviewed after completion, however, some words and grammatical errors may remain.  TONY Pelayo PA-C  [facility]  Electronically signed by Ramona An PA-C

## 2018-07-10 NOTE — MR AVS SNAPSHOT
"              After Visit Summary   7/10/2018    Bhaskar Carrillo    MRN: [id number]           Patient Information     Date Of Birth          1940        Visit Information        Provider Department      7/10/2018 9:30 AM Ramona An PA-C [facility]        Today's Diagnoses     Hypertension goal BP (blood pressure) < 140/90    -  1    CKD (chronic kidney disease) stage 3, GFR 30-59 ml/min        Essential hypertension with goal blood pressure less than 140/90        Chronic obstructive pulmonary disease, unspecified COPD type (H)        Tobacco use disorder        Need for prophylactic vaccination against Streptococcus pneumoniae (pneumococcus)        Need for prophylactic vaccination with tetanus-diphtheria (TD)        Screen for colon cancer           Follow-ups after your visit        Who to contact     If you have questions or need follow up information about today's clinic visit or your schedule please contact [facility] directly at [phone].  Normal or non-critical lab and imaging results will be communicated to you by MyChart, letter or phone within 4 business days after the clinic has received the results. If you do not hear from us within 7 days, please contact the clinic through MyChart or phone. If you have a critical or abnormal lab result, we will notify you by phone as soon as possible.  Submit refill requests through Perdoo or call your pharmacy and they will forward the refill request to us. Please allow 3 business days for your refill to be completed.          Additional Information About Your Visit        Care EveryWhere ID     This is your Care EveryWhere ID. This could be used by other organizations to access your [place] medical records  [id number]        Your Vitals Were     Pulse Temperature Respirations Height Pulse Oximetry BMI (Body Mass Index)    72 97.8  F (36.6  C) (Temporal) 16 5' 5.87\" (1.673 m) 97% 30.63 kg/m2       Blood Pressure from " Last 3 Encounters:   07/10/18 136/64   01/09/18 122/70   07/25/17 126/74    Weight from Last 3 Encounters:   07/10/18 189 lb (85.7 kg)   07/25/17 181 lb 4.8 oz (82.2 kg)   01/10/17 190 lb 11.2 oz (86.5 kg)              We Performed the Following     BASIC METABOLIC PANEL     COPD ACTION PLAN     Hemoglobin          Today's Medication Changes          These changes are accurate as of 7/10/18  9:47 AM.  If you have any questions, ask your nurse or doctor.               These medicines have changed or have updated prescriptions.        Dose/Directions    * lisinopril 40 MG tablet   Commonly known as:  PRINIVIL/ZESTRIL   This may have changed:  Another medication with the same name was added. Make sure you understand how and when to take each.   Used for:  CKD (chronic kidney disease) stage 3, GFR 30-59 ml/min   Changed by:  Ramona An PA-C        Dose:  40 mg   Take 1 tablet (40 mg) by mouth daily   Quantity:  90 tablet   Refills:  1       * lisinopril 20 MG tablet   Commonly known as:  PRINIVIL/ZESTRIL   This may have changed:  You were already taking a medication with the same name, and this prescription was added. Make sure you understand how and when to take each.   Used for:  CKD (chronic kidney disease) stage 3, GFR 30-59 ml/min   Changed by:  Ramona An PA-C        Dose:  20 mg   Take 1 tablet (20 mg) by mouth daily   Quantity:  90 tablet   Refills:  3       * Notice:  This list has 2 medication(s) that are the same as other medications prescribed for you. Read the directions carefully, and ask your doctor or other care provider to review them with you.         Where to get your medicines      These medications were sent to [facility] [place] - [address]  [address] [facility] [address]     Phone:  [phone]     hydrochlorothiazide 25 MG tablet    lisinopril 20 MG tablet                Primary Care Provider Office Phone # Fax #    Ramona An PA-C [phone]        [address] [place] [address]        Equal Access to Services     [facility] NESTOR : Hadii maritza noble chu Gomez, warenzoda luqrodyha, qabryantta jazminaustenchristian saxena, mariah naqvikacijulia garcia. So [facility] [phone].    ATENCIÓN: Si habla español, tiene a knapp disposición servicios gratuitos de asistencia lingüística. [facility] [phone].    We comply with applicable federal civil rights laws and [place] laws. We do not discriminate on the basis of race, color, national origin, age, disability, sex, sexual orientation, or gender identity.            Thank you!     Thank you for choosing [facility] [place]  for your care. Our goal is always to provide you with excellent care. Hearing back from our patients is one way we can continue to improve our services. Please take a few minutes to complete the written survey that you may receive in the mail after your visit with us. Thank you!             Your Updated Medication List - Protect others around you: Learn how to safely use, store and throw away your medicines at www.disposemymeds.org.          This list is accurate as of 7/10/18  9:47 AM.  Always use your most recent med list.                   Brand Name Dispense Instructions for use Diagnosis    albuterol 108 (90 Base) MCG/ACT Inhaler    PROAIR HFA/PROVENTIL HFA/VENTOLIN HFA    3 Inhaler    Inhale 2 puffs into the lungs every 4 hours as needed for shortness of breath / dyspnea    Chronic obstructive pulmonary disease, unspecified COPD type (H)       hydrochlorothiazide 25 MG tablet    HYDRODIURIL    90 tablet    Take 1 tablet (25 mg) by mouth every morning    Essential hypertension with goal blood pressure less than 140/90       * lisinopril 40 MG tablet    PRINIVIL/ZESTRIL    90 tablet    Take 1 tablet (40 mg) by mouth daily    CKD (chronic kidney disease) stage 3, GFR 30-59 ml/min       * lisinopril 20 MG tablet    PRINIVIL/ZESTRIL    90 tablet    Take 1 tablet (20 mg) by mouth daily     CKD (chronic kidney disease) stage 3, GFR 30-59 ml/min       STATIN NOT PRESCRIBED (INTENTIONAL)      by Other route continuous prn.    Hyperlipidemia LDL goal < 130       * Notice:  This list has 2 medication(s) that are the same as other medications prescribed for you. Read the directions carefully, and ask your doctor or other care provider to review them with you.

## 2018-07-10 NOTE — LETTER
July 12, 2018      Bhaskar Carrillo  [address]   [facility] [address]        Dear ,    Your electrolytes are normal.  Your kidney functions are elevated, more so than they were 1 year ago.  Please limit the use of ibuprofen (advil) and naproxen( aleve) as this may worsen your kidney function.  You may use tylenol as needed for pain.  Your blood sugar also remains high though if you were not fasting for this test and I do not believe you were, this is considered normal.  Lets recheck your kidney functions again in 1-2 months to make sure they are not further worsening.  Your hemoglobin is normal.     Ramona An PA-C     Resulted Orders   BASIC METABOLIC PANEL   Result Value Ref Range    Sodium 140 133 - 144 mmol/L    Potassium 4.3 3.4 - 5.3 mmol/L    Chloride 106 94 - 109 mmol/L    Carbon Dioxide 25 20 - 32 mmol/L    Anion Gap 9 3 - 14 mmol/L    Glucose 115 (H) 70 - 99 mg/dL    Urea Nitrogen 21 7 - 30 mg/dL    Creatinine 1.44 (H) 0.66 - 1.25 mg/dL    GFR Estimate 48 (L) >60 mL/min/1.7m2      Comment:      Non  GFR Calc    GFR Estimate If Black 58 (L) >60 mL/min/1.7m2      Comment:       GFR Calc    Calcium 9.5 8.5 - 10.1 mg/dL   Hemoglobin   Result Value Ref Range    Hemoglobin 14.2 13.3 - 17.7 g/dL       If you have any questions or concerns, please call the clinic at the number listed above.

## 2018-07-24 NOTE — NURSING NOTE
Bhaskar Carrillo is a 77 year old patient who comes in today for a Blood Pressure check.  Initial BP:  /70  Pulse 60     Data Unavailable  Disposition: follow-up as previously indicated by provider and results routed to provider    Patient brought BP machine and took BP twice. BP read 144/86. Manually took BP, BP is 136/70

## 2018-07-24 NOTE — MR AVS SNAPSHOT
After Visit Summary   7/24/2018    Bhaskar Carrillo    MRN: [id number]           Patient Information     Date Of Birth          1940        Visit Information        Provider Department      7/24/2018 9:00 AM NL FLOAT NURSE [facility]        Today's Diagnoses     BP check    -  1       Follow-ups after your visit        Your next 10 appointments already scheduled     Jul 24, 2018  9:00 AM CDT   Nurse Only with NL FLOAT NURSE [facility] ([facility])    [address] [facility] [address] [phone]              Who to contact     If you have questions or need follow up information about today's clinic visit or your schedule please contact [facility] directly at [phone].  Normal or non-critical lab and imaging results will be communicated to you by MyChart, letter or phone within 4 business days after the clinic has received the results. If you do not hear from us within 7 days, please contact the clinic through MyChart or phone. If you have a critical or abnormal lab result, we will notify you by phone as soon as possible.  Submit refill requests through [email or web address] or call your pharmacy and they will forward the refill request to us. Please allow 3 business days for your refill to be completed.          Additional Information About Your Visit        Care EveryWhere ID     This is your Care EveryWhere ID. This could be used by other organizations to access your [place] medical records  [id number]        Your Vitals Were     Pulse                   60            Blood Pressure from Last 3 Encounters:   07/24/18 136/70   07/10/18 136/64   01/09/18 122/70    Weight from Last 3 Encounters:   07/10/18 189 lb (85.7 kg)   07/25/17 181 lb 4.8 oz (82.2 kg)   01/10/17 190 lb 11.2 oz (86.5 kg)              Today, you had the following     No orders found for display       Primary Care Provider Office Phone # Fax #     Ramona An PA-C [phone]       [address] [place] [address]        Equal Access to Services     JOHN BAEZ : Vita maritza noble chu Gomez, warenzoda lutacho, adalita kaaustenda hemanth, mariah friedger radha. So [facility] [phone].    ATENCIÓN: Si habla español, tiene a knapp disposición servicios gratuitos de asistencia lingüística. Llame al [phone].    We comply with applicable federal civil rights laws and [place] laws. We do not discriminate on the basis of race, color, national origin, age, disability, sex, sexual orientation, or gender identity.            Thank you!     Thank you for choosing [facility]  for your care. Our goal is always to provide you with excellent care. Hearing back from our patients is one way we can continue to improve our services. Please take a few minutes to complete the written survey that you may receive in the mail after your visit with us. Thank you!             Your Updated Medication List - Protect others around you: Learn how to safely use, store and throw away your medicines at www.disposemymeds.org.          This list is accurate as of 7/24/18  8:53 AM.  Always use your most recent med list.                   Brand Name Dispense Instructions for use Diagnosis    albuterol 108 (90 Base) MCG/ACT Inhaler    PROAIR HFA/PROVENTIL HFA/VENTOLIN HFA    3 Inhaler    Inhale 2 puffs into the lungs every 4 hours as needed for shortness of breath / dyspnea    Chronic obstructive pulmonary disease, unspecified COPD type (H)       hydrochlorothiazide 25 MG tablet    HYDRODIURIL    90 tablet    Take 1 tablet (25 mg) by mouth every morning    Essential hypertension with goal blood pressure less than 140/90       * lisinopril 40 MG tablet    PRINIVIL/ZESTRIL    90 tablet    Take 1 tablet (40 mg) by mouth daily    CKD (chronic kidney disease) stage 3, GFR 30-59 ml/min       * lisinopril 20 MG tablet    PRINIVIL/ZESTRIL    90  tablet    Take 1 tablet (20 mg) by mouth daily    CKD (chronic kidney disease) stage 3, GFR 30-59 ml/min       STATIN NOT PRESCRIBED (INTENTIONAL)      by Other route continuous prn.    Hyperlipidemia LDL goal < 130       * Notice:  This list has 2 medication(s) that are the same as other medications prescribed for you. Read the directions carefully, and ask your doctor or other care provider to review them with you.

## 2019-01-01 ENCOUNTER — ANESTHESIA (OUTPATIENT)
Dept: INTENSIVE CARE | Facility: CLINIC | Age: 79
DRG: 435 | End: 2019-01-01
Payer: MEDICARE

## 2019-01-01 ENCOUNTER — APPOINTMENT (OUTPATIENT)
Dept: PHYSICAL THERAPY | Facility: CLINIC | Age: 79
DRG: 435 | End: 2019-01-01
Attending: FAMILY MEDICINE
Payer: MEDICARE

## 2019-01-01 ENCOUNTER — ANESTHESIA EVENT (OUTPATIENT)
Dept: INTENSIVE CARE | Facility: CLINIC | Age: 79
DRG: 435 | End: 2019-01-01
Payer: MEDICARE

## 2019-01-01 ENCOUNTER — APPOINTMENT (OUTPATIENT)
Dept: OCCUPATIONAL THERAPY | Facility: CLINIC | Age: 79
DRG: 435 | End: 2019-01-01
Payer: MEDICARE

## 2019-01-01 ENCOUNTER — APPOINTMENT (OUTPATIENT)
Dept: ULTRASOUND IMAGING | Facility: CLINIC | Age: 79
End: 2019-01-01
Attending: EMERGENCY MEDICINE
Payer: MEDICARE

## 2019-01-01 ENCOUNTER — APPOINTMENT (OUTPATIENT)
Dept: PHYSICAL THERAPY | Facility: CLINIC | Age: 79
DRG: 435 | End: 2019-01-01
Payer: MEDICARE

## 2019-01-01 ENCOUNTER — APPOINTMENT (OUTPATIENT)
Dept: CT IMAGING | Facility: CLINIC | Age: 79
DRG: 435 | End: 2019-01-01
Attending: FAMILY MEDICINE
Payer: MEDICARE

## 2019-01-01 ENCOUNTER — APPOINTMENT (OUTPATIENT)
Dept: SPEECH THERAPY | Facility: CLINIC | Age: 79
DRG: 435 | End: 2019-01-01
Attending: FAMILY MEDICINE
Payer: MEDICARE

## 2019-01-01 ENCOUNTER — HOSPITAL ENCOUNTER (EMERGENCY)
Facility: CLINIC | Age: 79
Discharge: HOME | End: 2019-05-03
Attending: EMERGENCY MEDICINE | Admitting: EMERGENCY MEDICINE
Payer: MEDICARE

## 2019-01-01 ENCOUNTER — APPOINTMENT (OUTPATIENT)
Dept: OCCUPATIONAL THERAPY | Facility: CLINIC | Age: 79
DRG: 435 | End: 2019-01-01
Attending: FAMILY MEDICINE
Payer: MEDICARE

## 2019-01-01 ENCOUNTER — APPOINTMENT (OUTPATIENT)
Dept: SPEECH THERAPY | Facility: CLINIC | Age: 79
DRG: 435 | End: 2019-01-01
Payer: MEDICARE

## 2019-01-01 ENCOUNTER — APPOINTMENT (OUTPATIENT)
Dept: GENERAL RADIOLOGY | Facility: CLINIC | Age: 79
DRG: 435 | End: 2019-01-01
Attending: FAMILY MEDICINE
Payer: MEDICARE

## 2019-01-01 ENCOUNTER — APPOINTMENT (OUTPATIENT)
Dept: GENERAL RADIOLOGY | Facility: CLINIC | Age: 79
End: 2019-01-01
Attending: EMERGENCY MEDICINE
Payer: MEDICARE

## 2019-01-01 ENCOUNTER — APPOINTMENT (OUTPATIENT)
Dept: CARDIOLOGY | Facility: CLINIC | Age: 79
DRG: 435 | End: 2019-01-01
Attending: PEDIATRICS
Payer: MEDICARE

## 2019-01-01 ENCOUNTER — HOSPITAL ENCOUNTER (INPATIENT)
Facility: CLINIC | Age: 79
LOS: 5 days | DRG: 435 | End: 2019-05-12
Attending: FAMILY MEDICINE | Admitting: FAMILY MEDICINE
Payer: MEDICARE

## 2019-01-01 VITALS
DIASTOLIC BLOOD PRESSURE: 38 MMHG | SYSTOLIC BLOOD PRESSURE: 77 MMHG | HEIGHT: 66 IN | WEIGHT: 182.76 LBS | HEART RATE: 73 BPM | OXYGEN SATURATION: 79 % | TEMPERATURE: 96.2 F | BODY MASS INDEX: 29.37 KG/M2 | RESPIRATION RATE: 22 BRPM

## 2019-01-01 VITALS
HEART RATE: 87 BPM | WEIGHT: 160.7 LBS | OXYGEN SATURATION: 97 % | BODY MASS INDEX: 26.04 KG/M2 | RESPIRATION RATE: 28 BRPM | TEMPERATURE: 98.2 F | SYSTOLIC BLOOD PRESSURE: 107 MMHG | DIASTOLIC BLOOD PRESSURE: 56 MMHG

## 2019-01-01 DIAGNOSIS — C78.7 LIVER METASTASIS: ICD-10-CM

## 2019-01-01 DIAGNOSIS — C79.9 METASTATIC CANCER (H): ICD-10-CM

## 2019-01-01 DIAGNOSIS — F17.200 TOBACCO USE DISORDER: ICD-10-CM

## 2019-01-01 DIAGNOSIS — R53.1 WEAKNESS: ICD-10-CM

## 2019-01-01 DIAGNOSIS — J44.9 CHRONIC OBSTRUCTIVE PULMONARY DISEASE, UNSPECIFIED COPD TYPE (H): ICD-10-CM

## 2019-01-01 DIAGNOSIS — R29.6 FALLING EPISODES: ICD-10-CM

## 2019-01-01 DIAGNOSIS — R17 JAUNDICE: ICD-10-CM

## 2019-01-01 DIAGNOSIS — Z91.81 PERSONAL HISTORY OF FALL: ICD-10-CM

## 2019-01-01 DIAGNOSIS — R63.4 WEIGHT LOSS: ICD-10-CM

## 2019-01-01 LAB
ABO + RH BLD: NORMAL
ABO + RH BLD: NORMAL
ALBUMIN SERPL-MCNC: 2.4 G/DL (ref 3.4–5)
ALBUMIN SERPL-MCNC: 2.5 G/DL (ref 3.4–5)
ALBUMIN UR-MCNC: NEGATIVE MG/DL
ALP SERPL-CCNC: 594 U/L (ref 40–150)
ALP SERPL-CCNC: 639 U/L (ref 40–150)
ALT SERPL W P-5'-P-CCNC: 128 U/L (ref 0–70)
ALT SERPL W P-5'-P-CCNC: 243 U/L (ref 0–70)
ANION GAP SERPL CALCULATED.3IONS-SCNC: 11 MMOL/L (ref 3–14)
ANION GAP SERPL CALCULATED.3IONS-SCNC: 12 MMOL/L (ref 3–14)
ANION GAP SERPL CALCULATED.3IONS-SCNC: 15 MMOL/L (ref 3–14)
ANION GAP SERPL CALCULATED.3IONS-SCNC: 19 MMOL/L (ref 3–14)
APPEARANCE UR: ABNORMAL
APTT PPP: 55 SEC (ref 22–37)
AST SERPL W P-5'-P-CCNC: 155 U/L (ref 0–45)
AST SERPL W P-5'-P-CCNC: 319 U/L (ref 0–45)
BASE DEFICIT BLDA-SCNC: 7.7 MMOL/L
BASE DEFICIT BLDV-SCNC: 2.5 MMOL/L
BASE DEFICIT BLDV-SCNC: 7 MMOL/L
BASE EXCESS BLDV CALC-SCNC: 0.6 MMOL/L
BASOPHILS # BLD AUTO: 0 10E9/L (ref 0–0.2)
BASOPHILS # BLD AUTO: 0.1 10E9/L (ref 0–0.2)
BASOPHILS NFR BLD AUTO: 0.3 %
BASOPHILS NFR BLD AUTO: 0.3 %
BILIRUB SERPL-MCNC: 5.3 MG/DL (ref 0.2–1.3)
BILIRUB SERPL-MCNC: 6.5 MG/DL (ref 0.2–1.3)
BILIRUB UR QL STRIP: NEGATIVE
BLD GP AB SCN SERPL QL: NORMAL
BLOOD BANK CMNT PATIENT-IMP: NORMAL
BUN SERPL-MCNC: 100 MG/DL (ref 7–30)
BUN SERPL-MCNC: 65 MG/DL (ref 7–30)
BUN SERPL-MCNC: 66 MG/DL (ref 7–30)
BUN SERPL-MCNC: 73 MG/DL (ref 7–30)
BUN SERPL-MCNC: 81 MG/DL (ref 7–30)
BUN SERPL-MCNC: 87 MG/DL (ref 7–30)
CALCIUM SERPL-MCNC: 7.7 MG/DL (ref 8.5–10.1)
CALCIUM SERPL-MCNC: 8 MG/DL (ref 8.5–10.1)
CALCIUM SERPL-MCNC: 8.1 MG/DL (ref 8.5–10.1)
CALCIUM SERPL-MCNC: 8.2 MG/DL (ref 8.5–10.1)
CALCIUM SERPL-MCNC: 8.7 MG/DL (ref 8.5–10.1)
CALCIUM SERPL-MCNC: 9.6 MG/DL (ref 8.5–10.1)
CHLORIDE SERPL-SCNC: 101 MMOL/L (ref 94–109)
CHLORIDE SERPL-SCNC: 102 MMOL/L (ref 94–109)
CHLORIDE SERPL-SCNC: 107 MMOL/L (ref 94–109)
CHLORIDE SERPL-SCNC: 108 MMOL/L (ref 94–109)
CHLORIDE SERPL-SCNC: 113 MMOL/L (ref 94–109)
CHLORIDE SERPL-SCNC: 116 MMOL/L (ref 94–109)
CHLORIDE SERPL-SCNC: 118 MMOL/L (ref 94–109)
CK SERPL-CCNC: 242 U/L (ref 30–300)
CO2 SERPL-SCNC: 17 MMOL/L (ref 20–32)
CO2 SERPL-SCNC: 17 MMOL/L (ref 20–32)
CO2 SERPL-SCNC: 18 MMOL/L (ref 20–32)
CO2 SERPL-SCNC: 19 MMOL/L (ref 20–32)
CO2 SERPL-SCNC: 22 MMOL/L (ref 20–32)
COLOR UR AUTO: ABNORMAL
CORTIS SERPL-MCNC: 31.5 UG/DL (ref 4–22)
CREAT SERPL-MCNC: 1.47 MG/DL (ref 0.66–1.25)
CREAT SERPL-MCNC: 1.48 MG/DL (ref 0.66–1.25)
CREAT SERPL-MCNC: 1.5 MG/DL (ref 0.66–1.25)
CREAT SERPL-MCNC: 1.59 MG/DL (ref 0.66–1.25)
CREAT SERPL-MCNC: 1.61 MG/DL (ref 0.66–1.25)
CREAT SERPL-MCNC: 1.82 MG/DL (ref 0.66–1.25)
CREAT SERPL-MCNC: 1.84 MG/DL (ref 0.66–1.25)
DIFFERENTIAL METHOD BLD: ABNORMAL
DIFFERENTIAL METHOD BLD: ABNORMAL
EOSINOPHIL NFR BLD AUTO: 0.2 %
EOSINOPHIL NFR BLD AUTO: 0.4 %
ERYTHROCYTE [DISTWIDTH] IN BLOOD BY AUTOMATED COUNT: 17.4 % (ref 10–15)
ERYTHROCYTE [DISTWIDTH] IN BLOOD BY AUTOMATED COUNT: 18.7 % (ref 10–15)
ERYTHROCYTE [DISTWIDTH] IN BLOOD BY AUTOMATED COUNT: 18.8 % (ref 10–15)
ERYTHROCYTE [DISTWIDTH] IN BLOOD BY AUTOMATED COUNT: 19.8 % (ref 10–15)
ERYTHROCYTE [DISTWIDTH] IN BLOOD BY AUTOMATED COUNT: 20.6 % (ref 10–15)
GFR SERPL CREATININE-BSD FRML MDRD: 35 ML/MIN/{1.73_M2}
GFR SERPL CREATININE-BSD FRML MDRD: 35 ML/MIN/{1.73_M2}
GFR SERPL CREATININE-BSD FRML MDRD: 41 ML/MIN/{1.73_M2}
GFR SERPL CREATININE-BSD FRML MDRD: 41 ML/MIN/{1.73_M2}
GFR SERPL CREATININE-BSD FRML MDRD: 44 ML/MIN/{1.73_M2}
GFR SERPL CREATININE-BSD FRML MDRD: 45 ML/MIN/{1.73_M2}
GFR SERPL CREATININE-BSD FRML MDRD: 45 ML/MIN/{1.73_M2}
GLUCOSE BLDC GLUCOMTR-MCNC: 129 MG/DL (ref 70–99)
GLUCOSE BLDC GLUCOMTR-MCNC: 144 MG/DL (ref 70–99)
GLUCOSE BLDC GLUCOMTR-MCNC: 150 MG/DL (ref 70–99)
GLUCOSE BLDC GLUCOMTR-MCNC: 167 MG/DL (ref 70–99)
GLUCOSE BLDC GLUCOMTR-MCNC: 201 MG/DL (ref 70–99)
GLUCOSE SERPL-MCNC: 100 MG/DL (ref 70–99)
GLUCOSE SERPL-MCNC: 179 MG/DL (ref 70–99)
GLUCOSE SERPL-MCNC: 73 MG/DL (ref 70–99)
GLUCOSE SERPL-MCNC: 79 MG/DL (ref 70–99)
GLUCOSE SERPL-MCNC: 83 MG/DL (ref 70–99)
GLUCOSE SERPL-MCNC: 97 MG/DL (ref 70–99)
GLUCOSE SERPL-MCNC: 97 MG/DL (ref 70–99)
GLUCOSE UR STRIP-MCNC: NEGATIVE MG/DL
HCO3 BLD-SCNC: 16 MMOL/L (ref 21–28)
HCO3 BLDV-SCNC: 18 MMOL/L (ref 21–28)
HCO3 BLDV-SCNC: 24 MMOL/L (ref 21–28)
HCO3 BLDV-SCNC: 25 MMOL/L (ref 21–28)
HCT VFR BLD AUTO: 25.7 % (ref 40–53)
HCT VFR BLD AUTO: 26.7 % (ref 40–53)
HCT VFR BLD AUTO: 28.7 % (ref 40–53)
HCT VFR BLD AUTO: 38.4 % (ref 40–53)
HCT VFR BLD AUTO: 40 % (ref 40–53)
HEMOCCULT STL QL: POSITIVE
HGB BLD-MCNC: 12.7 G/DL (ref 13.3–17.7)
HGB BLD-MCNC: 13.3 G/DL (ref 13.3–17.7)
HGB BLD-MCNC: 7.5 G/DL (ref 13.3–17.7)
HGB BLD-MCNC: 8.2 G/DL (ref 13.3–17.7)
HGB BLD-MCNC: 8.4 G/DL (ref 13.3–17.7)
HGB BLD-MCNC: 8.5 G/DL (ref 13.3–17.7)
HGB BLD-MCNC: 8.6 G/DL (ref 13.3–17.7)
HGB BLD-MCNC: 9 G/DL (ref 13.3–17.7)
HGB BLD-MCNC: 9 G/DL (ref 13.3–17.7)
HGB BLD-MCNC: 9.4 G/DL (ref 13.3–17.7)
HGB UR QL STRIP: ABNORMAL
HYALINE CASTS #/AREA URNS LPF: 12 /LPF (ref 0–2)
IMM GRANULOCYTES # BLD: 0.3 10E9/L (ref 0–0.4)
IMM GRANULOCYTES # BLD: 0.5 10E9/L (ref 0–0.4)
IMM GRANULOCYTES NFR BLD: 2.1 %
IMM GRANULOCYTES NFR BLD: 3 %
INR PPP: 2.39 (ref 0.86–1.14)
KETONES UR STRIP-MCNC: NEGATIVE MG/DL
LACTATE BLD-SCNC: 2.3 MMOL/L (ref 0.7–2)
LACTATE BLD-SCNC: 2.5 MMOL/L (ref 0.7–2)
LACTATE BLD-SCNC: 2.6 MMOL/L (ref 0.7–2)
LACTATE BLD-SCNC: 2.8 MMOL/L (ref 0.7–2)
LACTATE BLD-SCNC: 2.9 MMOL/L (ref 0.7–2)
LACTATE BLD-SCNC: 3.2 MMOL/L (ref 0.7–2)
LACTATE BLD-SCNC: 3.4 MMOL/L (ref 0.7–2)
LACTATE BLD-SCNC: 3.7 MMOL/L (ref 0.7–2)
LACTATE BLD-SCNC: 4.2 MMOL/L (ref 0.7–2)
LACTATE BLD-SCNC: 5.2 MMOL/L (ref 0.7–2)
LACTATE BLD-SCNC: 5.8 MMOL/L (ref 0.7–2)
LACTATE BLD-SCNC: 6.3 MMOL/L (ref 0.7–2)
LEUKOCYTE ESTERASE UR QL STRIP: NEGATIVE
LIPASE SERPL-CCNC: 748 U/L (ref 73–393)
LYMPHOCYTES # BLD AUTO: 1.2 10E9/L (ref 0.8–5.3)
LYMPHOCYTES # BLD AUTO: 1.6 10E9/L (ref 0.8–5.3)
LYMPHOCYTES NFR BLD AUTO: 9 %
LYMPHOCYTES NFR BLD AUTO: 9 %
MAGNESIUM SERPL-MCNC: 2.5 MG/DL (ref 1.6–2.3)
MCH RBC QN AUTO: 31.2 PG (ref 26.5–33)
MCH RBC QN AUTO: 31.5 PG (ref 26.5–33)
MCH RBC QN AUTO: 31.9 PG (ref 26.5–33)
MCHC RBC AUTO-ENTMCNC: 31.9 G/DL (ref 31.5–36.5)
MCHC RBC AUTO-ENTMCNC: 32.2 G/DL (ref 31.5–36.5)
MCHC RBC AUTO-ENTMCNC: 32.8 G/DL (ref 31.5–36.5)
MCHC RBC AUTO-ENTMCNC: 33.1 G/DL (ref 31.5–36.5)
MCHC RBC AUTO-ENTMCNC: 33.3 G/DL (ref 31.5–36.5)
MCV RBC AUTO: 100 FL (ref 78–100)
MCV RBC AUTO: 94 FL (ref 78–100)
MCV RBC AUTO: 95 FL (ref 78–100)
MCV RBC AUTO: 96 FL (ref 78–100)
MCV RBC AUTO: 98 FL (ref 78–100)
MONOCYTES # BLD AUTO: 1.1 10E9/L (ref 0–1.3)
MONOCYTES # BLD AUTO: 1.2 10E9/L (ref 0–1.3)
MONOCYTES NFR BLD AUTO: 6.8 %
MONOCYTES NFR BLD AUTO: 8.4 %
MUCOUS THREADS #/AREA URNS LPF: PRESENT /LPF
NEUTROPHILS # BLD AUTO: 10.3 10E9/L (ref 1.6–8.3)
NEUTROPHILS # BLD AUTO: 14.1 10E9/L (ref 1.6–8.3)
NEUTROPHILS NFR BLD AUTO: 79.8 %
NEUTROPHILS NFR BLD AUTO: 80.7 %
NITRATE UR QL: NEGATIVE
NRBC # BLD AUTO: 0 10*3/UL
NRBC # BLD AUTO: 0.1 10*3/UL
NRBC BLD AUTO-RTO: 0 /100
NRBC BLD AUTO-RTO: 0 /100
NT-PROBNP SERPL-MCNC: 395 PG/ML (ref 0–1800)
O2/TOTAL GAS SETTING VFR VENT: 21 %
O2/TOTAL GAS SETTING VFR VENT: 28 %
PCO2 BLD: 28 MM HG (ref 35–45)
PCO2 BLDV: 35 MM HG (ref 40–50)
PCO2 BLDV: 37 MM HG (ref 40–50)
PCO2 BLDV: 51 MM HG (ref 40–50)
PH BLD: 7.38 PH (ref 7.35–7.45)
PH BLDV: 7.29 PH (ref 7.32–7.43)
PH BLDV: 7.33 PH (ref 7.32–7.43)
PH BLDV: 7.43 PH (ref 7.32–7.43)
PH UR STRIP: 5 PH (ref 5–7)
PLATELET # BLD AUTO: 111 10E9/L (ref 150–450)
PLATELET # BLD AUTO: 119 10E9/L (ref 150–450)
PLATELET # BLD AUTO: 135 10E9/L (ref 150–450)
PLATELET # BLD AUTO: 158 10E9/L (ref 150–450)
PLATELET # BLD AUTO: 165 10E9/L (ref 150–450)
PO2 BLD: 72 MM HG (ref 80–105)
PO2 BLDV: 41 MM HG (ref 25–47)
PO2 BLDV: 41 MM HG (ref 25–47)
PO2 BLDV: 64 MM HG (ref 25–47)
POTASSIUM SERPL-SCNC: 3.9 MMOL/L (ref 3.4–5.3)
POTASSIUM SERPL-SCNC: 4 MMOL/L (ref 3.4–5.3)
POTASSIUM SERPL-SCNC: 4.2 MMOL/L (ref 3.4–5.3)
POTASSIUM SERPL-SCNC: 4.2 MMOL/L (ref 3.4–5.3)
POTASSIUM SERPL-SCNC: 4.6 MMOL/L (ref 3.4–5.3)
POTASSIUM SERPL-SCNC: 4.6 MMOL/L (ref 3.4–5.3)
POTASSIUM SERPL-SCNC: 4.8 MMOL/L (ref 3.4–5.3)
PROCALCITONIN SERPL-MCNC: 12.79 NG/ML
PROCALCITONIN SERPL-MCNC: 6.73 NG/ML
PROCALCITONIN SERPL-MCNC: 8.78 NG/ML
PROCALCITONIN SERPL-MCNC: 8.98 NG/ML
PROT SERPL-MCNC: 5.6 G/DL (ref 6.8–8.8)
PROT SERPL-MCNC: 5.8 G/DL (ref 6.8–8.8)
RBC # BLD AUTO: 2.57 10E12/L (ref 4.4–5.9)
RBC # BLD AUTO: 2.73 10E12/L (ref 4.4–5.9)
RBC # BLD AUTO: 2.98 10E12/L (ref 4.4–5.9)
RBC # BLD AUTO: 4.03 10E12/L (ref 4.4–5.9)
RBC # BLD AUTO: 4.26 10E12/L (ref 4.4–5.9)
RBC #/AREA URNS AUTO: 16 /HPF (ref 0–2)
SODIUM SERPL-SCNC: 135 MMOL/L (ref 133–144)
SODIUM SERPL-SCNC: 139 MMOL/L (ref 133–144)
SODIUM SERPL-SCNC: 140 MMOL/L (ref 133–144)
SODIUM SERPL-SCNC: 141 MMOL/L (ref 133–144)
SODIUM SERPL-SCNC: 142 MMOL/L (ref 133–144)
SODIUM SERPL-SCNC: 145 MMOL/L (ref 133–144)
SODIUM SERPL-SCNC: 147 MMOL/L (ref 133–144)
SOURCE: ABNORMAL
SP GR UR STRIP: 1.02 (ref 1–1.03)
SPECIMEN EXP DATE BLD: NORMAL
SQUAMOUS #/AREA URNS AUTO: <1 /HPF (ref 0–1)
TROPONIN I SERPL-MCNC: <0.015 UG/L (ref 0–0.04)
TSH SERPL DL<=0.005 MIU/L-ACNC: 2.86 MU/L (ref 0.4–4)
URATE CRY #/AREA URNS HPF: ABNORMAL /HPF
UROBILINOGEN UR STRIP-MCNC: 4 MG/DL (ref 0–2)
WBC # BLD AUTO: 11.3 10E9/L (ref 4–11)
WBC # BLD AUTO: 11.7 10E9/L (ref 4–11)
WBC # BLD AUTO: 12.8 10E9/L (ref 4–11)
WBC # BLD AUTO: 14.5 10E9/L (ref 4–11)
WBC # BLD AUTO: 15.8 10E9/L (ref 4–11)
WBC # BLD AUTO: 17.4 10E9/L (ref 4–11)
WBC #/AREA URNS AUTO: 0 /HPF (ref 0–5)

## 2019-01-01 PROCEDURE — 25000132 ZZH RX MED GY IP 250 OP 250 PS 637: Performed by: PEDIATRICS

## 2019-01-01 PROCEDURE — 99207 ZZC CDG-MDM COMPONENT: MEETS MODERATE - UP CODED: CPT | Performed by: FAMILY MEDICINE

## 2019-01-01 PROCEDURE — A9270 NON-COVERED ITEM OR SERVICE: HCPCS | Performed by: PEDIATRICS

## 2019-01-01 PROCEDURE — 36415 COLL VENOUS BLD VENIPUNCTURE: CPT | Performed by: FAMILY MEDICINE

## 2019-01-01 PROCEDURE — 76705 ECHO EXAM OF ABDOMEN: CPT

## 2019-01-01 PROCEDURE — 83605 ASSAY OF LACTIC ACID: CPT | Performed by: FAMILY MEDICINE

## 2019-01-01 PROCEDURE — 99291 CRITICAL CARE FIRST HOUR: CPT | Performed by: PEDIATRICS

## 2019-01-01 PROCEDURE — 99239 HOSP IP/OBS DSCHRG MGMT >30: CPT | Performed by: PEDIATRICS

## 2019-01-01 PROCEDURE — A9270 NON-COVERED ITEM OR SERVICE: HCPCS | Performed by: FAMILY MEDICINE

## 2019-01-01 PROCEDURE — 84145 PROCALCITONIN (PCT): CPT | Performed by: FAMILY MEDICINE

## 2019-01-01 PROCEDURE — 25800030 ZZH RX IP 258 OP 636: Performed by: FAMILY MEDICINE

## 2019-01-01 PROCEDURE — 99285 EMERGENCY DEPT VISIT HI MDM: CPT | Mod: 25 | Performed by: FAMILY MEDICINE

## 2019-01-01 PROCEDURE — 12000000 ZZH R&B MED SURG/OB

## 2019-01-01 PROCEDURE — 85048 AUTOMATED LEUKOCYTE COUNT: CPT | Performed by: FAMILY MEDICINE

## 2019-01-01 PROCEDURE — 25000128 H RX IP 250 OP 636: Performed by: PEDIATRICS

## 2019-01-01 PROCEDURE — 92610 EVALUATE SWALLOWING FUNCTION: CPT | Mod: GN | Performed by: SPEECH-LANGUAGE PATHOLOGIST

## 2019-01-01 PROCEDURE — 97161 PT EVAL LOW COMPLEX 20 MIN: CPT | Mod: GP | Performed by: PHYSICAL THERAPIST

## 2019-01-01 PROCEDURE — 36600 WITHDRAWAL OF ARTERIAL BLOOD: CPT

## 2019-01-01 PROCEDURE — 25000125 ZZHC RX 250: Performed by: FAMILY MEDICINE

## 2019-01-01 PROCEDURE — 74177 CT ABD & PELVIS W/CONTRAST: CPT

## 2019-01-01 PROCEDURE — 94640 AIRWAY INHALATION TREATMENT: CPT

## 2019-01-01 PROCEDURE — 85610 PROTHROMBIN TIME: CPT | Performed by: PEDIATRICS

## 2019-01-01 PROCEDURE — 82803 BLOOD GASES ANY COMBINATION: CPT | Performed by: PEDIATRICS

## 2019-01-01 PROCEDURE — 85027 COMPLETE CBC AUTOMATED: CPT | Performed by: FAMILY MEDICINE

## 2019-01-01 PROCEDURE — 86900 BLOOD TYPING SEROLOGIC ABO: CPT | Performed by: PEDIATRICS

## 2019-01-01 PROCEDURE — 25000128 H RX IP 250 OP 636: Performed by: FAMILY MEDICINE

## 2019-01-01 PROCEDURE — 80053 COMPREHEN METABOLIC PANEL: CPT | Performed by: EMERGENCY MEDICINE

## 2019-01-01 PROCEDURE — 99284 EMERGENCY DEPT VISIT MOD MDM: CPT | Mod: 25 | Performed by: EMERGENCY MEDICINE

## 2019-01-01 PROCEDURE — 93308 TTE F-UP OR LMTD: CPT

## 2019-01-01 PROCEDURE — 25000132 ZZH RX MED GY IP 250 OP 250 PS 637: Performed by: FAMILY MEDICINE

## 2019-01-01 PROCEDURE — 99285 EMERGENCY DEPT VISIT HI MDM: CPT | Mod: 25

## 2019-01-01 PROCEDURE — 83690 ASSAY OF LIPASE: CPT | Performed by: EMERGENCY MEDICINE

## 2019-01-01 PROCEDURE — 99285 EMERGENCY DEPT VISIT HI MDM: CPT | Mod: Z6 | Performed by: FAMILY MEDICINE

## 2019-01-01 PROCEDURE — 80053 COMPREHEN METABOLIC PANEL: CPT | Performed by: FAMILY MEDICINE

## 2019-01-01 PROCEDURE — 82947 ASSAY GLUCOSE BLOOD QUANT: CPT | Performed by: PEDIATRICS

## 2019-01-01 PROCEDURE — 25000125 ZZHC RX 250: Performed by: PEDIATRICS

## 2019-01-01 PROCEDURE — 85018 HEMOGLOBIN: CPT | Performed by: PEDIATRICS

## 2019-01-01 PROCEDURE — 80048 BASIC METABOLIC PNL TOTAL CA: CPT | Performed by: FAMILY MEDICINE

## 2019-01-01 PROCEDURE — 40000983 ZZH STATISTIC HFNC ADULT NON-CPAP

## 2019-01-01 PROCEDURE — 96360 HYDRATION IV INFUSION INIT: CPT | Mod: 59 | Performed by: FAMILY MEDICINE

## 2019-01-01 PROCEDURE — 93321 DOPPLER ECHO F-UP/LMTD STD: CPT | Mod: 26 | Performed by: INTERNAL MEDICINE

## 2019-01-01 PROCEDURE — 99233 SBSQ HOSP IP/OBS HIGH 50: CPT | Performed by: FAMILY MEDICINE

## 2019-01-01 PROCEDURE — 80051 ELECTROLYTE PANEL: CPT | Performed by: PEDIATRICS

## 2019-01-01 PROCEDURE — 86901 BLOOD TYPING SEROLOGIC RH(D): CPT | Performed by: PEDIATRICS

## 2019-01-01 PROCEDURE — 82565 ASSAY OF CREATININE: CPT | Performed by: FAMILY MEDICINE

## 2019-01-01 PROCEDURE — 36415 COLL VENOUS BLD VENIPUNCTURE: CPT | Performed by: EMERGENCY MEDICINE

## 2019-01-01 PROCEDURE — 84145 PROCALCITONIN (PCT): CPT | Performed by: PEDIATRICS

## 2019-01-01 PROCEDURE — 82533 TOTAL CORTISOL: CPT | Performed by: PEDIATRICS

## 2019-01-01 PROCEDURE — 85018 HEMOGLOBIN: CPT | Performed by: FAMILY MEDICINE

## 2019-01-01 PROCEDURE — 92526 ORAL FUNCTION THERAPY: CPT | Mod: GN | Performed by: SPEECH-LANGUAGE PATHOLOGIST

## 2019-01-01 PROCEDURE — 96361 HYDRATE IV INFUSION ADD-ON: CPT | Performed by: FAMILY MEDICINE

## 2019-01-01 PROCEDURE — 82803 BLOOD GASES ANY COMBINATION: CPT | Performed by: EMERGENCY MEDICINE

## 2019-01-01 PROCEDURE — 71046 X-RAY EXAM CHEST 2 VIEWS: CPT | Mod: TC

## 2019-01-01 PROCEDURE — 20000003 ZZH R&B ICU

## 2019-01-01 PROCEDURE — 85730 THROMBOPLASTIN TIME PARTIAL: CPT | Performed by: PEDIATRICS

## 2019-01-01 PROCEDURE — 40000893 ZZH STATISTIC PT IP EVAL DEFER: Performed by: PHYSICAL THERAPIST

## 2019-01-01 PROCEDURE — 73523 X-RAY EXAM HIPS BI 5/> VIEWS: CPT | Mod: TC

## 2019-01-01 PROCEDURE — 83880 ASSAY OF NATRIURETIC PEPTIDE: CPT | Performed by: EMERGENCY MEDICINE

## 2019-01-01 PROCEDURE — 36415 COLL VENOUS BLD VENIPUNCTURE: CPT | Performed by: PEDIATRICS

## 2019-01-01 PROCEDURE — G0378 HOSPITAL OBSERVATION PER HR: HCPCS

## 2019-01-01 PROCEDURE — 87040 BLOOD CULTURE FOR BACTERIA: CPT | Performed by: FAMILY MEDICINE

## 2019-01-01 PROCEDURE — 97165 OT EVAL LOW COMPLEX 30 MIN: CPT | Mod: GO

## 2019-01-01 PROCEDURE — 71045 X-RAY EXAM CHEST 1 VIEW: CPT | Mod: TC

## 2019-01-01 PROCEDURE — 99233 SBSQ HOSP IP/OBS HIGH 50: CPT | Performed by: PEDIATRICS

## 2019-01-01 PROCEDURE — 83605 ASSAY OF LACTIC ACID: CPT | Performed by: PEDIATRICS

## 2019-01-01 PROCEDURE — 00000146 ZZHCL STATISTIC GLUCOSE BY METER IP

## 2019-01-01 PROCEDURE — 93308 TTE F-UP OR LMTD: CPT | Mod: 26 | Performed by: INTERNAL MEDICINE

## 2019-01-01 PROCEDURE — 85025 COMPLETE CBC W/AUTO DIFF WBC: CPT | Performed by: EMERGENCY MEDICINE

## 2019-01-01 PROCEDURE — 99220 ZZC INITIAL OBSERVATION CARE,LEVL III: CPT | Performed by: FAMILY MEDICINE

## 2019-01-01 PROCEDURE — 82550 ASSAY OF CK (CPK): CPT | Performed by: FAMILY MEDICINE

## 2019-01-01 PROCEDURE — 97127 ZZHC OT THERAPEUTIC INTERVENTION W/FOCUS ON COGNITIVE FUNCTION,EA 15 MIN: CPT | Mod: GO

## 2019-01-01 PROCEDURE — 87040 BLOOD CULTURE FOR BACTERIA: CPT | Performed by: PEDIATRICS

## 2019-01-01 PROCEDURE — 94640 AIRWAY INHALATION TREATMENT: CPT | Mod: 76

## 2019-01-01 PROCEDURE — 84484 ASSAY OF TROPONIN QUANT: CPT | Performed by: EMERGENCY MEDICINE

## 2019-01-01 PROCEDURE — 93010 ELECTROCARDIOGRAM REPORT: CPT | Mod: Z6 | Performed by: EMERGENCY MEDICINE

## 2019-01-01 PROCEDURE — 83735 ASSAY OF MAGNESIUM: CPT | Performed by: PEDIATRICS

## 2019-01-01 PROCEDURE — 82272 OCCULT BLD FECES 1-3 TESTS: CPT | Performed by: FAMILY MEDICINE

## 2019-01-01 PROCEDURE — 25800030 ZZH RX IP 258 OP 636: Performed by: PEDIATRICS

## 2019-01-01 PROCEDURE — 99207 ZZC CDG-MDM COMPONENT: MEETS MODERATE - UP CODED: CPT | Performed by: PEDIATRICS

## 2019-01-01 PROCEDURE — 85025 COMPLETE CBC W/AUTO DIFF WBC: CPT | Performed by: FAMILY MEDICINE

## 2019-01-01 PROCEDURE — 93325 DOPPLER ECHO COLOR FLOW MAPG: CPT | Mod: 26 | Performed by: INTERNAL MEDICINE

## 2019-01-01 PROCEDURE — 93005 ELECTROCARDIOGRAM TRACING: CPT

## 2019-01-01 PROCEDURE — 97530 THERAPEUTIC ACTIVITIES: CPT | Mod: GP | Performed by: PHYSICAL THERAPIST

## 2019-01-01 PROCEDURE — 84443 ASSAY THYROID STIM HORMONE: CPT | Performed by: PEDIATRICS

## 2019-01-01 PROCEDURE — 40000895 ZZH STATISTIC SLP IP EVAL DEFER: Performed by: SPEECH-LANGUAGE PATHOLOGIST

## 2019-01-01 PROCEDURE — 71260 CT THORAX DX C+: CPT

## 2019-01-01 PROCEDURE — 86850 RBC ANTIBODY SCREEN: CPT | Performed by: PEDIATRICS

## 2019-01-01 PROCEDURE — 81001 URINALYSIS AUTO W/SCOPE: CPT | Performed by: FAMILY MEDICINE

## 2019-01-01 RX ORDER — FUROSEMIDE 10 MG/ML
10 INJECTION INTRAMUSCULAR; INTRAVENOUS ONCE
Status: COMPLETED | OUTPATIENT
Start: 2019-01-01 | End: 2019-01-01

## 2019-01-01 RX ORDER — MORPHINE SULFATE 100 MG/5ML
5-10 SOLUTION ORAL
Status: DISCONTINUED | OUTPATIENT
Start: 2019-01-01 | End: 2019-01-01 | Stop reason: HOSPADM

## 2019-01-01 RX ORDER — ACETAMINOPHEN 650 MG/1
650 SUPPOSITORY RECTAL EVERY 4 HOURS PRN
Status: DISCONTINUED | OUTPATIENT
Start: 2019-01-01 | End: 2019-01-01

## 2019-01-01 RX ORDER — LANOLIN ALCOHOL/MO/W.PET/CERES
3 CREAM (GRAM) TOPICAL
Status: DISCONTINUED | OUTPATIENT
Start: 2019-01-01 | End: 2019-01-01 | Stop reason: HOSPADM

## 2019-01-01 RX ORDER — MINERAL OIL/HYDROPHIL PETROLAT
OINTMENT (GRAM) TOPICAL EVERY 8 HOURS PRN
Status: DISCONTINUED | OUTPATIENT
Start: 2019-01-01 | End: 2019-01-01 | Stop reason: HOSPADM

## 2019-01-01 RX ORDER — ALBUTEROL SULFATE 0.83 MG/ML
2.5 SOLUTION RESPIRATORY (INHALATION) EVERY 4 HOURS PRN
Status: DISCONTINUED | OUTPATIENT
Start: 2019-01-01 | End: 2019-01-01

## 2019-01-01 RX ORDER — SODIUM CHLORIDE, SODIUM LACTATE, POTASSIUM CHLORIDE, CALCIUM CHLORIDE 600; 310; 30; 20 MG/100ML; MG/100ML; MG/100ML; MG/100ML
INJECTION, SOLUTION INTRAVENOUS CONTINUOUS
Status: DISCONTINUED | OUTPATIENT
Start: 2019-01-01 | End: 2019-01-01

## 2019-01-01 RX ORDER — SODIUM CHLORIDE 9 MG/ML
1000 INJECTION, SOLUTION INTRAVENOUS CONTINUOUS
Status: DISCONTINUED | OUTPATIENT
Start: 2019-01-01 | End: 2019-01-01

## 2019-01-01 RX ORDER — ALBUTEROL SULFATE 90 UG/1
AEROSOL, METERED RESPIRATORY (INHALATION)
Qty: 54 G | Refills: 1 | Status: ON HOLD | OUTPATIENT
Start: 2019-01-01 | End: 2019-01-01

## 2019-01-01 RX ORDER — ONDANSETRON 2 MG/ML
4 INJECTION INTRAMUSCULAR; INTRAVENOUS ONCE
Status: DISCONTINUED | OUTPATIENT
Start: 2019-01-01 | End: 2019-01-01

## 2019-01-01 RX ORDER — SODIUM CHLORIDE 450 MG/100ML
INJECTION, SOLUTION INTRAVENOUS CONTINUOUS
Status: DISCONTINUED | OUTPATIENT
Start: 2019-01-01 | End: 2019-01-01

## 2019-01-01 RX ORDER — ONDANSETRON 2 MG/ML
4 INJECTION INTRAMUSCULAR; INTRAVENOUS EVERY 6 HOURS PRN
Status: DISCONTINUED | OUTPATIENT
Start: 2019-01-01 | End: 2019-01-01 | Stop reason: HOSPADM

## 2019-01-01 RX ORDER — HEPARIN SODIUM,PORCINE 10 UNIT/ML
5-10 VIAL (ML) INTRAVENOUS
Status: DISCONTINUED | OUTPATIENT
Start: 2019-01-01 | End: 2019-01-01

## 2019-01-01 RX ORDER — HYDROMORPHONE HYDROCHLORIDE 1 MG/ML
0.5 INJECTION, SOLUTION INTRAMUSCULAR; INTRAVENOUS; SUBCUTANEOUS EVERY 4 HOURS PRN
Status: DISCONTINUED | OUTPATIENT
Start: 2019-01-01 | End: 2019-01-01

## 2019-01-01 RX ORDER — HEPARIN SODIUM,PORCINE 10 UNIT/ML
5-10 VIAL (ML) INTRAVENOUS EVERY 24 HOURS
Status: DISCONTINUED | OUTPATIENT
Start: 2019-01-01 | End: 2019-01-01

## 2019-01-01 RX ORDER — DIGOXIN 0.25 MG/ML
500 INJECTION INTRAMUSCULAR; INTRAVENOUS ONCE
Status: COMPLETED | OUTPATIENT
Start: 2019-01-01 | End: 2019-01-01

## 2019-01-01 RX ORDER — PANTOPRAZOLE SODIUM 40 MG/1
40 TABLET, DELAYED RELEASE ORAL
Status: DISCONTINUED | OUTPATIENT
Start: 2019-01-01 | End: 2019-01-01 | Stop reason: HOSPADM

## 2019-01-01 RX ORDER — NALOXONE HYDROCHLORIDE 0.4 MG/ML
.1-.4 INJECTION, SOLUTION INTRAMUSCULAR; INTRAVENOUS; SUBCUTANEOUS
Status: DISCONTINUED | OUTPATIENT
Start: 2019-01-01 | End: 2019-01-01

## 2019-01-01 RX ORDER — IOPAMIDOL 755 MG/ML
500 INJECTION, SOLUTION INTRAVASCULAR ONCE
Status: COMPLETED | OUTPATIENT
Start: 2019-01-01 | End: 2019-01-01

## 2019-01-01 RX ORDER — ONDANSETRON 4 MG/1
4 TABLET, ORALLY DISINTEGRATING ORAL EVERY 6 HOURS PRN
Status: DISCONTINUED | OUTPATIENT
Start: 2019-01-01 | End: 2019-01-01 | Stop reason: HOSPADM

## 2019-01-01 RX ORDER — ALBUTEROL SULFATE 0.83 MG/ML
2.5 SOLUTION RESPIRATORY (INHALATION)
Status: DISCONTINUED | OUTPATIENT
Start: 2019-01-01 | End: 2019-01-01 | Stop reason: HOSPADM

## 2019-01-01 RX ORDER — FUROSEMIDE 10 MG/ML
10 INJECTION INTRAMUSCULAR; INTRAVENOUS ONCE
Status: DISCONTINUED | OUTPATIENT
Start: 2019-01-01 | End: 2019-01-01 | Stop reason: CLARIF

## 2019-01-01 RX ORDER — SALIVA STIMULANT COMB. NO.3
1 SPRAY, NON-AEROSOL (ML) MUCOUS MEMBRANE
Status: DISCONTINUED | OUTPATIENT
Start: 2019-01-01 | End: 2019-01-01 | Stop reason: HOSPADM

## 2019-01-01 RX ORDER — ACETAMINOPHEN 325 MG/1
650 TABLET ORAL EVERY 4 HOURS PRN
Status: DISCONTINUED | OUTPATIENT
Start: 2019-01-01 | End: 2019-01-01 | Stop reason: HOSPADM

## 2019-01-01 RX ORDER — ALBUTEROL SULFATE 90 UG/1
AEROSOL, METERED RESPIRATORY (INHALATION)
Qty: 54 G | Refills: 0 | Status: SHIPPED | OUTPATIENT
Start: 2019-01-01 | End: 2019-01-01

## 2019-01-01 RX ORDER — LORAZEPAM 2 MG/ML
2 CONCENTRATE ORAL EVERY 4 HOURS PRN
Status: DISCONTINUED | OUTPATIENT
Start: 2019-01-01 | End: 2019-01-01 | Stop reason: HOSPADM

## 2019-01-01 RX ORDER — OXYCODONE HYDROCHLORIDE 5 MG/1
5-10 TABLET ORAL
Status: DISCONTINUED | OUTPATIENT
Start: 2019-01-01 | End: 2019-01-01 | Stop reason: HOSPADM

## 2019-01-01 RX ORDER — MORPHINE SULFATE 10 MG/5ML
5-10 SOLUTION ORAL
Status: DISCONTINUED | OUTPATIENT
Start: 2019-01-01 | End: 2019-01-01 | Stop reason: HOSPADM

## 2019-01-01 RX ORDER — SODIUM CHLORIDE 9 MG/ML
INJECTION, SOLUTION INTRAVENOUS ONCE
Status: COMPLETED | OUTPATIENT
Start: 2019-01-01 | End: 2019-01-01

## 2019-01-01 RX ORDER — LIDOCAINE 40 MG/G
CREAM TOPICAL
Status: DISCONTINUED | OUTPATIENT
Start: 2019-01-01 | End: 2019-01-01 | Stop reason: HOSPADM

## 2019-01-01 RX ADMIN — FUROSEMIDE 10 MG: 10 INJECTION, SOLUTION INTRAVENOUS at 14:52

## 2019-01-01 RX ADMIN — PANTOPRAZOLE SODIUM 40 MG: 40 TABLET, DELAYED RELEASE ORAL at 07:06

## 2019-01-01 RX ADMIN — SODIUM CHLORIDE, POTASSIUM CHLORIDE, SODIUM LACTATE AND CALCIUM CHLORIDE 750 ML: 600; 310; 30; 20 INJECTION, SOLUTION INTRAVENOUS at 09:37

## 2019-01-01 RX ADMIN — HYDROCORTISONE SODIUM SUCCINATE 50 MG: 100 INJECTION, POWDER, FOR SOLUTION INTRAMUSCULAR; INTRAVENOUS at 09:36

## 2019-01-01 RX ADMIN — TAZOBACTAM SODIUM AND PIPERACILLIN SODIUM 3.38 G: 375; 3 INJECTION, SOLUTION INTRAVENOUS at 14:43

## 2019-01-01 RX ADMIN — FUROSEMIDE 10 MG: 10 INJECTION, SOLUTION INTRAVENOUS at 10:16

## 2019-01-01 RX ADMIN — SODIUM CHLORIDE 1000 ML: 9 INJECTION, SOLUTION INTRAVENOUS at 06:16

## 2019-01-01 RX ADMIN — TAZOBACTAM SODIUM AND PIPERACILLIN SODIUM 3.38 G: 375; 3 INJECTION, SOLUTION INTRAVENOUS at 09:37

## 2019-01-01 RX ADMIN — IOPAMIDOL 80 ML: 755 INJECTION, SOLUTION INTRAVENOUS at 09:03

## 2019-01-01 RX ADMIN — TAZOBACTAM SODIUM AND PIPERACILLIN SODIUM 3.38 G: 375; 3 INJECTION, SOLUTION INTRAVENOUS at 21:08

## 2019-01-01 RX ADMIN — SODIUM CHLORIDE 1000 ML: 9 INJECTION, SOLUTION INTRAVENOUS at 09:28

## 2019-01-01 RX ADMIN — OXYCODONE HYDROCHLORIDE 5 MG: 5 TABLET ORAL at 14:43

## 2019-01-01 RX ADMIN — SODIUM CHLORIDE, POTASSIUM CHLORIDE, SODIUM LACTATE AND CALCIUM CHLORIDE: 600; 310; 30; 20 INJECTION, SOLUTION INTRAVENOUS at 08:04

## 2019-01-01 RX ADMIN — Medication 100 MG: at 08:28

## 2019-01-01 RX ADMIN — DIGOXIN 500 MCG: 0.25 INJECTION INTRAMUSCULAR; INTRAVENOUS at 11:50

## 2019-01-01 RX ADMIN — HYDROCORTISONE SODIUM SUCCINATE 50 MG: 100 INJECTION, POWDER, FOR SOLUTION INTRAMUSCULAR; INTRAVENOUS at 19:54

## 2019-01-01 RX ADMIN — OXYCODONE HYDROCHLORIDE 5 MG: 5 TABLET ORAL at 21:08

## 2019-01-01 RX ADMIN — MORPHINE SULFATE 10 MG: 100 SOLUTION ORAL at 17:33

## 2019-01-01 RX ADMIN — SODIUM CHLORIDE 1000 ML: 9 INJECTION, SOLUTION INTRAVENOUS at 17:57

## 2019-01-01 RX ADMIN — ALBUTEROL SULFATE 2.5 MG: 2.5 SOLUTION RESPIRATORY (INHALATION) at 09:26

## 2019-01-01 RX ADMIN — ALBUTEROL SULFATE 2.5 MG: 2.5 SOLUTION RESPIRATORY (INHALATION) at 01:36

## 2019-01-01 RX ADMIN — HYDROCORTISONE SODIUM SUCCINATE 50 MG: 100 INJECTION, POWDER, FOR SOLUTION INTRAMUSCULAR; INTRAVENOUS at 01:34

## 2019-01-01 RX ADMIN — PANTOPRAZOLE SODIUM 40 MG: 40 TABLET, DELAYED RELEASE ORAL at 16:09

## 2019-01-01 RX ADMIN — TAZOBACTAM SODIUM AND PIPERACILLIN SODIUM 3.38 G: 375; 3 INJECTION, SOLUTION INTRAVENOUS at 03:25

## 2019-01-01 RX ADMIN — OXYCODONE HYDROCHLORIDE 5 MG: 5 TABLET ORAL at 18:05

## 2019-01-01 RX ADMIN — SODIUM BICARBONATE: 84 INJECTION, SOLUTION INTRAVENOUS at 11:00

## 2019-01-01 RX ADMIN — Medication 3 MG: at 02:09

## 2019-01-01 RX ADMIN — SODIUM CHLORIDE 1000 ML: 9 INJECTION, SOLUTION INTRAVENOUS at 18:05

## 2019-01-01 RX ADMIN — Medication 100 MG: at 13:40

## 2019-01-01 RX ADMIN — OLANZAPINE 5 MG: 5 TABLET, ORALLY DISINTEGRATING ORAL at 17:40

## 2019-01-01 RX ADMIN — SODIUM CHLORIDE 1000 ML: 9 INJECTION, SOLUTION INTRAVENOUS at 23:13

## 2019-01-01 RX ADMIN — OXYCODONE HYDROCHLORIDE 10 MG: 5 TABLET ORAL at 16:34

## 2019-01-01 RX ADMIN — SODIUM CHLORIDE: 9 INJECTION, SOLUTION INTRAVENOUS at 08:10

## 2019-01-01 RX ADMIN — SODIUM CHLORIDE 500 ML: 9 INJECTION, SOLUTION INTRAVENOUS at 06:46

## 2019-01-01 RX ADMIN — AMIODARONE HYDROCHLORIDE 150 MG: 1.5 INJECTION, SOLUTION INTRAVENOUS at 10:36

## 2019-01-01 RX ADMIN — SODIUM BICARBONATE: 84 INJECTION, SOLUTION INTRAVENOUS at 21:54

## 2019-01-01 RX ADMIN — SODIUM CHLORIDE, POTASSIUM CHLORIDE, SODIUM LACTATE AND CALCIUM CHLORIDE 1000 ML: 600; 310; 30; 20 INJECTION, SOLUTION INTRAVENOUS at 16:58

## 2019-01-01 RX ADMIN — ALBUTEROL SULFATE 2.5 MG: 2.5 SOLUTION RESPIRATORY (INHALATION) at 21:23

## 2019-01-01 RX ADMIN — LORAZEPAM 2 MG: 2 LIQUID ORAL at 01:26

## 2019-01-01 RX ADMIN — SODIUM CHLORIDE 60 ML: 9 INJECTION, SOLUTION INTRAVENOUS at 09:03

## 2019-01-01 RX ADMIN — Medication 3 MG: at 21:23

## 2019-01-01 RX ADMIN — OXYCODONE HYDROCHLORIDE 10 MG: 5 TABLET ORAL at 08:28

## 2019-01-01 RX ADMIN — TAZOBACTAM SODIUM AND PIPERACILLIN SODIUM 3.38 G: 375; 3 INJECTION, SOLUTION INTRAVENOUS at 20:56

## 2019-01-01 RX ADMIN — ACETAMINOPHEN 650 MG: 325 TABLET ORAL at 02:09

## 2019-01-01 RX ADMIN — SODIUM CHLORIDE 1000 ML: 9 INJECTION, SOLUTION INTRAVENOUS at 07:45

## 2019-01-01 RX ADMIN — SODIUM CHLORIDE 1000 ML: 9 INJECTION, SOLUTION INTRAVENOUS at 01:06

## 2019-01-01 RX ADMIN — AMIODARONE HYDROCHLORIDE 1 MG/MIN: 50 INJECTION, SOLUTION INTRAVENOUS at 11:08

## 2019-01-01 RX ADMIN — HYDROCORTISONE SODIUM SUCCINATE 50 MG: 100 INJECTION, POWDER, FOR SOLUTION INTRAMUSCULAR; INTRAVENOUS at 19:48

## 2019-01-01 RX ADMIN — SODIUM CHLORIDE 500 ML: 9 INJECTION, SOLUTION INTRAVENOUS at 06:49

## 2019-01-01 RX ADMIN — ALBUTEROL SULFATE 2.5 MG: 2.5 SOLUTION RESPIRATORY (INHALATION) at 12:27

## 2019-01-01 RX ADMIN — Medication 1 MG: at 22:11

## 2019-01-01 RX ADMIN — MORPHINE SULFATE 10 MG: 100 SOLUTION ORAL at 22:11

## 2019-01-01 RX ADMIN — ALBUTEROL SULFATE 2.5 MG: 2.5 SOLUTION RESPIRATORY (INHALATION) at 06:27

## 2019-01-01 RX ADMIN — PANTOPRAZOLE SODIUM 40 MG: 40 TABLET, DELAYED RELEASE ORAL at 16:34

## 2019-01-01 RX ADMIN — VANCOMYCIN HYDROCHLORIDE 1500 MG: 1 INJECTION, POWDER, LYOPHILIZED, FOR SOLUTION INTRAVENOUS at 21:52

## 2019-01-01 RX ADMIN — OXYCODONE HYDROCHLORIDE 10 MG: 5 TABLET ORAL at 12:12

## 2019-01-01 RX ADMIN — SODIUM CHLORIDE 1000 ML: 9 INJECTION, SOLUTION INTRAVENOUS at 10:21

## 2019-01-01 RX ADMIN — LORAZEPAM 2 MG: 2 LIQUID ORAL at 21:05

## 2019-01-01 RX ADMIN — SODIUM CHLORIDE 1000 ML: 9 INJECTION, SOLUTION INTRAVENOUS at 01:08

## 2019-01-01 RX ADMIN — MORPHINE SULFATE 10 MG: 100 SOLUTION ORAL at 19:37

## 2019-01-01 RX ADMIN — SODIUM CHLORIDE 1000 ML: 9 INJECTION, SOLUTION INTRAVENOUS at 09:25

## 2019-01-01 RX ADMIN — HYDROCORTISONE SODIUM SUCCINATE 50 MG: 100 INJECTION, POWDER, FOR SOLUTION INTRAMUSCULAR; INTRAVENOUS at 14:34

## 2019-01-01 RX ADMIN — ALBUTEROL SULFATE 2.5 MG: 2.5 SOLUTION RESPIRATORY (INHALATION) at 18:23

## 2019-01-01 ASSESSMENT — COPD QUESTIONNAIRES: COPD: 1

## 2019-01-01 ASSESSMENT — ACTIVITIES OF DAILY LIVING (ADL)
ADLS_ACUITY_SCORE: 25
ADLS_ACUITY_SCORE: 23
ADLS_ACUITY_SCORE: 21
ADLS_ACUITY_SCORE: 23
RETIRED_EATING: 0-->INDEPENDENT
ADLS_ACUITY_SCORE: 23
ADLS_ACUITY_SCORE: 23
ADLS_ACUITY_SCORE: 29
ADLS_ACUITY_SCORE: 22
AMBULATION: 3-->ASSISTIVE EQUIPMENT AND PERSON
ADLS_ACUITY_SCORE: 23
ADLS_ACUITY_SCORE: 21
FALL_HISTORY_WITHIN_LAST_SIX_MONTHS: YES
BATHING: 2-->ASSISTIVE PERSON
ADLS_ACUITY_SCORE: 25
TRANSFERRING: 3-->ASSISTIVE EQUIPMENT AND PERSON
ADLS_ACUITY_SCORE: 23
ADLS_ACUITY_SCORE: 21
PREVIOUS_RESPONSIBILITIES: MEAL PREP;HOUSEKEEPING;LAUNDRY;MEDICATION MANAGEMENT;FINANCES;DRIVING
DRESS: 2-->ASSISTIVE PERSON
ADLS_ACUITY_SCORE: 25
ADLS_ACUITY_SCORE: 21
ADLS_ACUITY_SCORE: 22
SWALLOWING: 2-->DIFFICULTY SWALLOWING LIQUIDS
ADLS_ACUITY_SCORE: 25
ADLS_ACUITY_SCORE: 25
ADLS_ACUITY_SCORE: 22
ADLS_ACUITY_SCORE: 23
ADLS_ACUITY_SCORE: 21
RETIRED_COMMUNICATION: 2-->DIFFICULTY UNDERSTANDING (NOT RELATED TO LANGUAGE BARRIER)
TOILETING: 2-->ASSISTIVE PERSON
NUMBER_OF_TIMES_PATIENT_HAS_FALLEN_WITHIN_LAST_SIX_MONTHS: 1
ADLS_ACUITY_SCORE: 22
ADLS_ACUITY_SCORE: 23
COGNITION: 1 - ATTENTION OR MEMORY DEFICITS

## 2019-01-01 ASSESSMENT — ENCOUNTER SYMPTOMS
ACTIVITY CHANGE: 1
FREQUENCY: 0
FATIGUE: 1
NAUSEA: 0
FATIGUE: 1
MYALGIAS: 1
BACK PAIN: 1
ACTIVITY CHANGE: 1
GASTROINTESTINAL NEGATIVE: 1
WEAKNESS: 1
SHORTNESS OF BREATH: 0
ABDOMINAL PAIN: 0
VOMITING: 0
DYSURIA: 0
APPETITE CHANGE: 1
LIGHT-HEADEDNESS: 0
DIARRHEA: 0
APPETITE CHANGE: 1
UNEXPECTED WEIGHT CHANGE: 1
FEVER: 0
EYE PAIN: 0
DIZZINESS: 0
BACK PAIN: 0
FEVER: 0
POLYDIPSIA: 0
CONFUSION: 0
CHEST TIGHTNESS: 0
COUGH: 0
COUGH: 1
CHILLS: 0
CONFUSION: 1
SORE THROAT: 0
WEAKNESS: 0

## 2019-01-01 ASSESSMENT — PAIN DESCRIPTION - DESCRIPTORS
DESCRIPTORS: ACHING
DESCRIPTORS: SHARP

## 2019-01-01 ASSESSMENT — MIFFLIN-ST. JEOR
SCORE: 1508.75
SCORE: 1501.75
SCORE: 1392.75

## 2019-01-01 ASSESSMENT — LIFESTYLE VARIABLES: TOBACCO_USE: 1

## 2019-02-05 NOTE — TELEPHONE ENCOUNTER
"Requested Prescriptions   Pending Prescriptions Disp Refills     VENTOLIN  (90 Base) MCG/ACT inhaler [Pharmacy Med Name: VENTOLIN (90BASE)MCG/ACT] 54 g      Sig: INHALE 2 PUFFS BY MOUTH EVERY 4 HOURS AS NEEDED FOR SHORTNESS OF BREATH / DYSPNEA    Asthma Maintenance Inhalers - Anticholinergics Passed - 2/5/2019  1:24 PM       Passed - Patient is age 12 years or older       Passed - Recent (12 mo) or future (30 days) visit within the authorizing provider's specialty    Patient had office visit in the last 12 months or has a visit in the next 30 days with authorizing provider or within the authorizing provider's specialty.  See \"Patient Info\" tab in inbasket, or \"Choose Columns\" in Meds & Orders section of the refill encounter.             Passed - Medication is active on med list        Prescription approved per [facility] Refill Protocol.  Arianna Briseno RN    "

## 2019-04-18 NOTE — TELEPHONE ENCOUNTER
"Requested Prescriptions   Pending Prescriptions Disp Refills     VENTOLIN  (90 Base) MCG/ACT inhaler [Pharmacy Med Name: VENTOLIN (90BASE)MCG/ACT] 54 g 0     Sig: INHALE 2 PUFFS BY MOUTH EVERY 4 HOURS AS NEEDED FOR SHORTNESS OF BREATH / DYSPNEA       Asthma Maintenance Inhalers - Anticholinergics Passed - 4/18/2019 11:50 AM        Passed - Patient is age 12 years or older        Passed - Recent (12 mo) or future (30 days) visit within the authorizing provider's specialty     Patient had office visit in the last 12 months or has a visit in the next 30 days with authorizing provider or within the authorizing provider's specialty.  See \"Patient Info\" tab in inbasket, or \"Choose Columns\" in Meds & Orders section of the refill encounter.              Passed - Medication is active on med list        Last ov 07/10/2018  Last filled 02/05/2019  No flowsheet data found.for ACT    Prescription approved per [facility] Refill Protocol.  Jax Newsome, RN, BSN                  "

## 2019-05-03 NOTE — ED AVS SNAPSHOT
[facility] Emergency Department  [address] [place] [address]  Phone:  [phone]  Fax:  [phone]                                    Bhaskar Carrillo   MRN: [id number]    Department:  [facility] Emergency Department   Date of Visit:  5/3/2019           After Visit Summary Signature Page    I have received my discharge instructions, and my questions have been answered. I have discussed any challenges I see with this plan with the nurse or doctor.    ..........................................................................................................................................  Patient/Patient Representative Signature      ..........................................................................................................................................  Patient Representative Print Name and Relationship to Patient    ..................................................               ................................................  Date                                   Time    ..........................................................................................................................................  Reviewed by Signature/Title    ...................................................              ..............................................  Date                                               Time          22EPIC Rev 08/18

## 2019-05-03 NOTE — ED TRIAGE NOTES
Has been having increased shortness of breath over the past couple of weeks. Has also been having issues with gastric reflex and eating.

## 2019-05-03 NOTE — ED PROVIDER NOTES
History     Chief Complaint   Patient presents with     Shortness of Breath     HPI  Bhaskar Carrillo is a 78 year old male with CKD, HTN, Elevated Lipids, COPD-presents with generalized fatigue, weight loss, shortness of breath with activity.  Patient has not had any change in cough frequency or phlegm.  He has had no fever chills.  Denies any night sweats.  With activities had increasing shortness of breath.  Denies any chest discomfort, heaviness in his chest or recognize palpitations.  Does report a 30+ pound weight loss in the last few months.  He believes part of this is intentional because he cut back on eating to lose weight.  He has had no edema or fluid retention in his extremities.  Had no focal neurologic complaints.  He is living independent assisted living type facility where his son states he gets very little assistance.      Allergies:  Allergies   Allergen Reactions     Simvastatin      Muscle pain,        Problem List:    Patient Active Problem List    Diagnosis Date Noted     CKD (chronic kidney disease) stage 3, GFR 30-59 ml/min (H) 07/03/2014     Priority: Medium     Hypertension goal BP (blood pressure) < 140/90 07/08/2013     Priority: Medium     Advanced directives, counseling/discussion 07/06/2011     Priority: Medium     Advance Directive Problem List Overview:   Name Relationship Phone    Primary Health Care Agent            Alternative Health Care Agent          Patient states has Advance Directive and will bring in a copy to clinic. Has copy on file at hospital.        Chaz Rothman,   05/03/19 1523      Chaz Rothman,   05/03/19 1633       Hyperlipidemia with target LDL less than 130 08/03/2010     Priority: Medium     Diagnosis updated by automated process. Provider to review and confirm.       Elevated blood sugar 06/16/2009     Priority: Medium     Tobacco use disorder 04/10/2009     Priority: Medium     Degeneration of lumbar or lumbosacral intervertebral disc  07/10/2002     Priority: Medium     Chronic airway obstruction (H) 06/06/2002     Priority: Medium     Problem list name updated by automated process. Provider to review          Past Medical History:    Past Medical History:   Diagnosis Date     Hypertension        Past Surgical History:    No past surgical history on file.    Family History:    Family History   Problem Relation Age of Onset     Lipids Mother      Lipids Father        Social History:  Marital Status:  Single [1]  Social History     Tobacco Use     Smoking status: Light Tobacco Smoker     Years: 50.00     Last attempt to quit: 3/12/2009     Years since quitting: 10.1     Smokeless tobacco: Never Used   Substance Use Topics     Alcohol use: No     Drug use: No        Medications:      hydrochlorothiazide (HYDRODIURIL) 25 MG tablet   lisinopril (PRINIVIL/ZESTRIL) 20 MG tablet   lisinopril (PRINIVIL/ZESTRIL) 40 MG tablet   STATIN NOT PRESCRIBED, INTENTIONAL,   VENTOLIN  (90 Base) MCG/ACT inhaler         Review of Systems   Constitutional: Positive for activity change, appetite change, fatigue and unexpected weight change. Negative for fever.   HENT: Negative.    Respiratory: Positive for cough.    Gastrointestinal: Negative.    Musculoskeletal: Positive for back pain and myalgias.   Neurological: Positive for weakness.   Psychiatric/Behavioral: Positive for confusion.   All other systems reviewed and are negative.      Physical Exam   BP: 102/59  Pulse: 81  Heart Rate: 81  Temp: 98.2  F (36.8  C)  Resp: 28  Weight: 72.9 kg (160 lb 11.2 oz)  SpO2: 98 %      Physical Exam   Constitutional: He is oriented to person, place, and time.   Ill-appearing, ashen slightly jaundiced appearing skin   Eyes: Pupils are equal, round, and reactive to light. EOM are normal. No scleral icterus.   [place]   Neck: No JVD present.   Cardiovascular: Normal rate and regular rhythm.   No murmur heard.  Pulmonary/Chest: Effort normal.   Diminished breath sounds in all lung  fields.  No active congestion.  No wheezing.  No consolidation.  No crackles or dullness to lung bases   Abdominal: Soft. Bowel sounds are normal.   Lymphadenopathy:     He has no cervical adenopathy.   Neurological: He is alert and oriented to person, place, and time.   Skin: Skin is warm. Capillary refill takes less than 2 seconds.   Psychiatric: He has a normal mood and affect.   Nursing note and vitals reviewed.      ED Course     ED Course as of May 03 1442   Fri May 03, 2019   1409 Hemoglobin: 13.3     Procedures  EKG:  Interpretation by Chaz Rothman DO.   Indication: Fatigue and shortness of breath  Sinus rhythm.  Rate 78 bpm.  Occasional PACs.  Right bundle branch block with left anterior fascicular block.  Impressions: abnormal EKG                 Results for orders placed or performed during the hospital encounter of 05/03/19 (from the past 24 hour(s))   Blood gas venous   Result Value Ref Range    Ph Venous 7.43 7.32 - 7.43 pH    PCO2 Venous 37 (L) 40 - 50 mm Hg    PO2 Venous 41 25 - 47 mm Hg    Bicarbonate Venous 25 21 - 28 mmol/L    Base Excess Venous 0.6 mmol/L    FIO2 21    Comprehensive metabolic panel   Result Value Ref Range    Sodium 135 133 - 144 mmol/L    Potassium 4.6 3.4 - 5.3 mmol/L    Chloride 101 94 - 109 mmol/L    Carbon Dioxide 22 20 - 32 mmol/L    Anion Gap 12 3 - 14 mmol/L    Glucose 100 (H) 70 - 99 mg/dL    Urea Nitrogen 65 (H) 7 - 30 mg/dL    Creatinine 1.59 (H) 0.66 - 1.25 mg/dL    GFR Estimate 41 (L) >60 mL/min/[1.73_m2]    GFR Estimate If Black 48 (L) >60 mL/min/[1.73_m2]    Calcium 8.7 8.5 - 10.1 mg/dL    Bilirubin Total 5.3 (H) 0.2 - 1.3 mg/dL    Albumin 2.4 (L) 3.4 - 5.0 g/dL    Protein Total 5.8 (L) 6.8 - 8.8 g/dL    Alkaline Phosphatase 594 (H) 40 - 150 U/L     (H) 0 - 70 U/L     (H) 0 - 45 U/L   Nt probnp inpatient   Result Value Ref Range    N-Terminal Pro BNP Inpatient 395 0 - 1,800 pg/mL   Troponin I   Result Value Ref Range    Troponin I ES <0.015 0.000 -  0.045 ug/L   Lipase   Result Value Ref Range    Lipase 748 (H) 73 - 393 U/L   CBC with platelets differential   Result Value Ref Range    WBC 12.8 (H) 4.0 - 11.0 10e9/L    RBC Count 4.26 (L) 4.4 - 5.9 10e12/L    Hemoglobin 13.3 13.3 - 17.7 g/dL    Hematocrit 40.0 40.0 - 53.0 %    MCV 94 78 - 100 fl    MCH 31.2 26.5 - 33.0 pg    MCHC 33.3 31.5 - 36.5 g/dL    RDW 17.4 (H) 10.0 - 15.0 %    Platelet Count 158 150 - 450 10e9/L    Diff Method Automated Method     % Neutrophils 79.8 %    % Lymphocytes 9.0 %    % Monocytes 8.4 %    % Eosinophils 0.4 %    % Basophils 0.3 %    % Immature Granulocytes 2.1 %    Nucleated RBCs 0 0 /100    Absolute Neutrophil 10.3 (H) 1.6 - 8.3 10e9/L    Absolute Lymphocytes 1.2 0.8 - 5.3 10e9/L    Absolute Monocytes 1.1 0.0 - 1.3 10e9/L    Absolute Basophils 0.0 0.0 - 0.2 10e9/L    Abs Immature Granulocytes 0.3 0 - 0.4 10e9/L    Absolute Nucleated RBC 0.0    XR Chest 2 Views    Narrative    CHEST TWO VIEWS   5/3/2019 1:47 PM     HISTORY: Short of air.    COMPARISON: None.    FINDINGS: The heart size is normal. The lungs are clear. No  pneumothorax or pleural effusion. Degenerative disease and scoliosis  in the thoracic spine.      Impression    IMPRESSION: No acute abnormality.    BALBINA VALVERDE MD       Medications - No data to display    Assessments & Plan (with Medical Decision Making)  78-year-old male presents with shortness of breath.  His son is concerned that might be having a flare of COPD.  Patient reports no increased cough frequency, hemoptysis or change in appearance or abundance of phlegm.  He has had no fever.  Patient denies any chest discomfort or recognize palpitations.  He has had no extremity swelling/edema.  He does admit to a 30+ weight loss the last month or 2 though he admits some of that is intentional because he thought he was getting to having wanted to lose some weight.  He does admit that he has  no appetite.    Medical work-up will include CBC, CMP, troponin, BMP,  chest x-ray.  Differential diagnosis includes COPD exacerbation, acute coronary event, occult malignancy    2:59 PM  Patient is growing impatient with his stay in the emergency department.  He states he does not like to see doctors and only goes in once per year usually in July to see his primary care provider to get his medications refilled.  I informed the patient along with his son Gadiel that we have abnormal labs showing an elevated bilirubin and liver function studies.  The patient also reports he has been having some increasing difficulty swallowing food boluses.  He also experiences some discomfort postprandial.  We will proceed with ultrasound of the right upper quadrant.  If this is not conclusive for identifying why his bilirubin is over 5 and his LFTs are elevated would then recommend as an outpatient schedule him for CT of the chest abdomen pelvis for occult malignancy work-up.  3:20 PM  Unfortunately the medical evaluation per ultrasound confirms concerns for liver metastasis.  He did not have a defined primary.  I had discussed these results with both the patient and his son Gadiel.  I referred the patient back to the primary care provider.  There can have a family conference to decide if they want comfort care measures only if they want to proceed with confirmed diagnosis, staging and treatment options.     I have reviewed the nursing notes.    I have reviewed the findings, diagnosis, plan and need for follow up with the patient.         Medication List      There are no discharge medications for this visit.         Final diagnoses:   Weight loss   Jaundice   Liver metastasis (H)-unknown primary       5/3/2019   [facility] EMERGENCY DEPARTMENT

## 2019-05-03 NOTE — DISCHARGE INSTRUCTIONS
Please schedule an appointment with your primary clinic provider for further work-up.  Preliminary testing in the emergency department is concerning for liver metastasis.  This is where cancer that starts elsewhere in the body can spread to the liver.  Work-up to include consideration for scheduling a biopsy to determine if this is cancer and what type it is.  In addition a medical work-up including CT PET scan.  This is an imaging test that looks at the body from a chest through abdomen and pelvis to identify where the cancer could have originated and spread to.  This is the reason why you are so tired and short of breath along with recent experiencing of weight loss.

## 2019-05-06 PROBLEM — C79.9 METASTATIC CANCER (H): Status: ACTIVE | Noted: 2019-01-01

## 2019-05-06 PROBLEM — R53.1 WEAKNESS: Status: ACTIVE | Noted: 2019-01-01
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
## 2019-05-06 PROBLEM — E46 PROTEIN-CALORIE MALNUTRITION (H): Status: ACTIVE | Noted: 2019-01-01

## 2019-05-06 NOTE — ED NOTES
Family requesting to speak to Chery from [facility] again. She will be down in approx 30 minutes. Family notified. Digna Goldberg RN

## 2019-05-06 NOTE — PROGRESS NOTES
S-(situation): Patient registered to Observation. Patient arrived to room 252 via cart from ED.     B-(background): Metastatic cancer; weakness and fall at home.     A-(assessment): Generalized weakness. VSS on RA. Disoriented to place and time. Excoriated buttocks; scabs throughout extremities and severely dry/flacky.     R-(recommendations): Orders and observation goals reviewed with patient and son, Gadiel.     Nursing Observation criteria listed below was met:    Skin issues/needs documented:Yes  Isolation needs addressed, if appropriate: NA  Fall Prevention: Education given and documented: Yes  Education Assessment documented:Yes  Education Documented (Pre-existing chronic infection such as, MRSA/VRE need education on admission): Yes  OBS video/handout Reviewed & DocumentedYes  Medication Reconciliation Complete: Yes  New medication patient education completed and documented (Possible Side Effects of Common Medications handout): Yes  Home medications if not able to send immediately home with family stored here: NA  Reminder note placed in discharge instructions: NA  Patient has discharge needs (If yes, please explain): Yes. Will need to go home on hospice/home health.

## 2019-05-06 NOTE — PLAN OF CARE
Discharge Planner PT   Patient plan for discharge: Home, family unwilling to discuss assistance available for patient  Current status: Patient is a 78 year old male brought to the ED by EMS after being found down for an unknown duration after an unwitnessed fall. Patient seen in the emergency department for assessment to assist with discharge planning. Patient recently diagnosed with advanced metastatic cancer with a previous medical history of HTN, CKD, smoking, COPD, hyperlipidemia, weight loss. Currently, patient with good UE break strength to manual muscle testing, however poor endurance. Bilat LE 4-/5 SLR hip flexion, hip extension and hip abduction, also poor muscular endurance. MOD IND rolling in bed with use of grab bar, notes narrow bed. Supine to sitting EOB, required mod assist to prevent rolling out of bed due to high ED bed height and narrow bed for maneuvering (anticipate patient would be IND in his home environment). SBA sit to/from stand with 2WW. Ambulated 75 ft with SBA, 2WW, slow pace with shuffled gait pattern, however no LOB and minimal reliance on walker for stability. Sit to/from stand from recliner MOD IND x 2 with good muscular control. IND short sitting balance.   Barriers to return to prior living situation: lack of 24/7 assistance  Recommendations for discharge: Home with 24/7 assistance and front wheeled walker, palliative care (HHPT, HHAide, HHRN)  Rationale for recommendations: Patient tolerated today's assessment well. Given patient's recent diagnosis and fall at home 24/7 assistance is recommended. Attempted to discuss with family patient discharging home with family support and 24/7 assistance and patient refuse discussion, also attempted to discuss increase check ins as well as neighbors staying with patient and family refuse this discussion. Patient without current functional mobility needs, however given medical diagnosis, lack of family assistance, recent fall and prognosis of  disease pathology patient would benefit from long term care placement for safe disposition and medical management.       Entered by: Kathleen Arredondo 05/06/2019 3:33 PM     Thank you for your referral.    Kathleen Arredondo, PT, DPT, ATC    [facility]    O: [phone]  E: [email or web address]

## 2019-05-06 NOTE — ED TRIAGE NOTES
Patient brought to ED via EMS after daughter called 911. Apparently patient fell from chair yesterday and has been on the floor since. He reports pain in legs bilaterally if he tries to stand. Digna Goldberg RN

## 2019-05-06 NOTE — ED NOTES
Family out into hallway; patient wants a wheelchair to go home. Patient is too weak to care for himself. They would like to have [facility] come to speak with them RE: possible resources, plan of care. Digna Goldberg RN

## 2019-05-06 NOTE — ED PROVIDER NOTES
History     Chief Complaint   Patient presents with     Fall     HPI  Bhaskar Carrillo is a 78 year old male with history of hypertension, chronic kidney disease, hyperlipidemia, smoking and COPD who presents to the emergency room after being found on the ground by his neighbor.  Patient states he fell yesterday around 12 noon.  He was unable to get himself up.  He tried crawling to the door.  A neighbor who checks on him daily found him on the floor.  He states both legs hurt or that they are weak.  He does not know why he did not get up if it was weakness or pain.  The patient was just here on 5/3 with weight loss, weakness and elevated liver enzymes.  Liver mass was found on ultrasound.  It was recommended he follow-up with his primary care physician for discussion and further work-up.  The patient lives alone independently in a assisted living facility.  Son says he is very resistant to seek health care.  Son reports a rapid deterioration in his weight and generalized weakness over the past 2 months or so.  Patient states he laid on the floor for 18 hours.  He feels generally weak which has been getting worse the past 2 months and coincides with a progressive weight loss.  Family has not been able to get him to seek any health care until recently on 5/3 at which time it was found that he had a mass in his liver most likely metastatic disease.  Patient was sent home to follow-up with his primary care physician.    Allergies:  Allergies   Allergen Reactions     Simvastatin      Muscle pain,        Problem List:           Past Medical History:    Past Medical History:   Diagnosis Date     Hypertension        Past Surgical History:    No past surgical history on file.    Family History:    Family History   Problem Relation Age of Onset     Lipids Mother      Lipids Father        Social History:  Marital Status:  Single [1]  Social History     Tobacco Use     Smoking status: Light Tobacco Smoker     Years: 50.00      Last attempt to quit: 3/12/2009     Years since quitting: 10.1     Smokeless tobacco: Never Used   Substance Use Topics     Alcohol use: No     Drug use: No        Medications:      hydrochlorothiazide (HYDRODIURIL) 25 MG tablet   lisinopril (PRINIVIL/ZESTRIL) 20 MG tablet   lisinopril (PRINIVIL/ZESTRIL) 40 MG tablet   STATIN NOT PRESCRIBED, INTENTIONAL,   VENTOLIN  (90 Base) MCG/ACT inhaler         Review of Systems   Constitutional: Positive for activity change, appetite change and fatigue. Negative for chills and fever.   HENT: Negative for congestion and sore throat.    Eyes: Negative for pain and visual disturbance.   Respiratory: Negative for cough, chest tightness and shortness of breath.    Cardiovascular: Negative for chest pain.   Gastrointestinal: Negative for abdominal pain, diarrhea, nausea and vomiting.   Endocrine: Negative for polydipsia and polyuria.   Genitourinary: Negative for dysuria and frequency.   Musculoskeletal: Negative for back pain.   Skin: Negative for rash.   Allergic/Immunologic: Negative for immunocompromised state.   Neurological: Negative for dizziness, weakness and light-headedness.   Psychiatric/Behavioral: Negative for confusion.   All other systems reviewed and are negative.      Physical Exam   BP: 106/63  Pulse: 90  Temp: 97.8  F (36.6  C)  Resp: 16  Weight: 75 kg (165 lb 5.5 oz)(Bed scale)  SpO2: 97 %      Physical Exam   Constitutional: He is oriented to person, place, and time. No distress.   Alert thin frail cachectic appearing 78-year-old male.  He appears no acute distress or pain.  /63   Pulse 90   Temp 97.8  F (36.6  C) (Oral)   Resp 16   Wt 75 kg (165 lb 5.5 oz)   SpO2 97%   BMI 26.80 kg/m       HENT:   Head: Normocephalic and atraumatic.   Right Ear: External ear normal.   Left Ear: External ear normal.   Nose: Nose normal.   Mouth/Throat: Oropharynx is clear and moist.   Dry mucous membranes   Eyes: Pupils are equal, round, and reactive to light.  Conjunctivae and EOM are normal.   Neck: Normal range of motion. Neck supple.   Cardiovascular: Normal rate, regular rhythm, normal heart sounds and intact distal pulses.   Pulmonary/Chest: Effort normal and breath sounds normal.   Abdominal: Soft. He exhibits mass. There is tenderness.   There is a palpable mass or liver edge appreciated.   Musculoskeletal: Normal range of motion.   Neurological: He is alert and oriented to person, place, and time.   Skin: Skin is warm and dry. Capillary refill takes less than 2 seconds.   Psychiatric: He has a normal mood and affect. His behavior is normal.   Nursing note and vitals reviewed.      ED Course        Procedures               Critical Care time:  none               Results for orders placed or performed during the hospital encounter of 05/06/19   CT Chest/Abdomen/Pelvis w Contrast    Narrative    CT CHEST/ABDOMEN/PELVIS WITH CONTRAST   5/6/2019 9:22 AM     HISTORY: Weakness, weight loss, intrahepatic mass, elevated liver  enzymes.    TECHNIQUE: CT of the chest, abdomen, and pelvis was completed with  administration of intravenous contrast. A total of 80 mL Isovue-370  was injected intravenously. Radiation dose for this scan was reduced  using automated exposure control, adjustment of the mA and/or kV  according to patient size, or iterative reconstruction technique.    COMPARISON: None.    FINDINGS:   Chest: Right lower lobe mass is present measuring 2.3 cm. More  inferior subpleural right lower lobe pulmonary nodule is present  measuring 1 cm. Posteromedial subpleural pulmonary nodule is present  measuring 1.5 cm. Right middle lobe pulmonary nodule is present  measuring 7 mm. Multiple smaller right-sided middle lobe pulmonary  nodules are present. Calcified left upper lobe pulmonary nodule is  present. Bulky right-sided hilar lymphadenopathy is present  collectively measuring up to 3.3 cm. Enlarged low right paratracheal  lymph node is present measuring 1.9 cm. No  left-sided hilar  lymphadenopathy is identified. Mild right-sided paraseptal emphysema  is present. Small amount of fluid is present within the  posterior/dependent pericardial recess. Heart size is normal.  Scattered vascular calcifications are present. No evidence of  pericardial effusion.    Abdomen: Innumerable hypoattenuating masses are present throughout the  liver. No evidence of biliary ductal dilatation. Portal and hepatic  veins are patent. Multiple enlarged periportal lymph nodes are present  measuring up to 1.8 cm. Mass is present within the right adrenal gland  measuring up to 3.8 cm. Left adrenal gland demonstrates mild  thickening. A 9 mm mass within the inferolateral left adrenal gland.  Kidneys demonstrate no evidence of hydronephrosis. Bilateral  nonobstructive nephrolithiasis is present. The largest is an 8 mm  stone in the right renal pelvis. Exophytic lesions are present  projecting off the bilateral kidneys which are incompletely  characterized. The left kidney demonstrates an exophytic lesion  measuring 2.3 cm projecting posteriorly. Hounsfield units are  approximately 40. Right kidney demonstrates a lesion projecting off  the superolateral right kidney measuring 1.4 cm with measuring  approximately 30-40 Hounsfield units. Smaller subcentimeter  hypoattenuating lesion is present within the cortex of the right  lateral kidney, likely benign cyst. The abdominal aorta demonstrates  severe atherosclerotic plaquing most severely affecting the infrarenal  abdominal aorta. There is approximately 60-70% narrowing. The left  common iliac artery is occluded. Severe plaquing is present throughout  the more peripheral branches with possible occlusion of the left  proximal femoral vasculature. No thickened or dilated loops of bowel  are identified. Sigmoid diverticulosis is present. Appendix is  unremarkable. No evidence of retroperitoneal lymphadenopathy.    Pelvis: Bladder is unremarkable. No free fluid  in the pelvis. Small  fat-containing right-sided inguinal hernia is present. Small bilateral  testicular hydroceles are noted with small associated scrotal  calcifications.    Bone windows demonstrate lytic mass within the left lateral fourth  rib. No other destructive or aggressive osseous lesions are  identified. Mild to moderate spinal and pelvic degenerative change is  present.      Impression    IMPRESSION:   1. Right lower lobe lung mass, multiple pulmonary nodules, and bulky  right hilar/mediastinal lymphadenopathy consistent with metastatic  disease.  2. Innumerable masses throughout the liver and enlarged periportal  lymph nodes consistent with metastatic disease.   3. Lytic mass in the left lateral fourth rib consistent with  metastatic disease.  4. Nonspecific bilateral adrenal masses (right larger than left),  suspicious for metastatic disease.  5. 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  6. Bilateral exophytic renal lesions which are incompletely  characterized, statistically likely benign/hyperdense cysts. Bilateral  nonobstructive nephrolithiasis.  7. Sigmoid diverticulosis without evidence of diverticulitis.    MARCUS CLIFTON MD   XR Pelvis and Hip Bilateral 2 Views    Narrative    PELVIS AND HIP BILATERAL TWO VIEWS   5/6/2019 9:23 AM     HISTORY: Fall 24-hours ago--cannot stand.    COMPARISON: None.    FINDINGS: None.      Impression    IMPRESSION: No fracture is identified. Sacrum is obscured by bowel  gas. Lower lumbar spine degenerative change is present. Mild bilateral  hip degenerative change is present. Soft tissues are unremarkable.    MARCUS CLIFTON MD   CBC with platelets differential   Result Value Ref Range    WBC 17.4 (H) 4.0 - 11.0 10e9/L    RBC Count 4.03 (L) 4.4 - 5.9 10e12/L    Hemoglobin 12.7 (L) 13.3 - 17.7 g/dL    Hematocrit 38.4 (L) 40.0 - 53.0 %    MCV 95 78 - 100 fl    MCH 31.5 26.5 - 33.0 pg    MCHC 33.1 31.5 - 36.5  g/dL    RDW 18.7 (H) 10.0 - 15.0 %    Platelet Count 165 150 - 450 10e9/L    Diff Method Automated Method     % Neutrophils 80.7 %    % Lymphocytes 9.0 %    % Monocytes 6.8 %    % Eosinophils 0.2 %    % Basophils 0.3 %    % Immature Granulocytes 3.0 %    Nucleated RBCs 0 0 /100    Absolute Neutrophil 14.1 (H) 1.6 - 8.3 10e9/L    Absolute Lymphocytes 1.6 0.8 - 5.3 10e9/L    Absolute Monocytes 1.2 0.0 - 1.3 10e9/L    Absolute Basophils 0.1 0.0 - 0.2 10e9/L    Abs Immature Granulocytes 0.5 (H) 0 - 0.4 10e9/L    Absolute Nucleated RBC 0.1    Comprehensive metabolic panel   Result Value Ref Range    Sodium 140 133 - 144 mmol/L    Potassium 4.8 3.4 - 5.3 mmol/L    Chloride 102 94 - 109 mmol/L    Carbon Dioxide 19 (L) 20 - 32 mmol/L    Anion Gap 19 (H) 3 - 14 mmol/L    Glucose 79 70 - 99 mg/dL    Urea Nitrogen 87 (H) 7 - 30 mg/dL    Creatinine 1.82 (H) 0.66 - 1.25 mg/dL    GFR Estimate 35 (L) >60 mL/min/[1.73_m2]    GFR Estimate If Black 41 (L) >60 mL/min/[1.73_m2]    Calcium 9.6 8.5 - 10.1 mg/dL    Bilirubin Total 6.5 (H) 0.2 - 1.3 mg/dL    Albumin 2.5 (L) 3.4 - 5.0 g/dL    Protein Total 5.6 (L) 6.8 - 8.8 g/dL    Alkaline Phosphatase 639 (H) 40 - 150 U/L     (H) 0 - 70 U/L     (H) 0 - 45 U/L   CK total   Result Value Ref Range    CK Total 242 30 - 300 U/L   UA with Microscopic   Result Value Ref Range    Color Urine Bouchra     Appearance Urine Cloudy     Glucose Urine Negative NEG^Negative mg/dL    Bilirubin Urine Negative NEG^Negative    Ketones Urine Negative NEG^Negative mg/dL    Specific Gravity Urine 1.024 1.003 - 1.035    Blood Urine Moderate (A) NEG^Negative    pH Urine 5.0 5.0 - 7.0 pH    Protein Albumin Urine Negative NEG^Negative mg/dL    Urobilinogen mg/dL 4.0 (H) 0.0 - 2.0 mg/dL    Nitrite Urine Negative NEG^Negative    Leukocyte Esterase Urine Negative NEG^Negative    Source Unspecified Urine     WBC Urine 0 0 - 5 /HPF    RBC Urine 16 (H) 0 - 2 /HPF    Squamous Epithelial /HPF Urine <1 0 - 1 /HPF     Mucous Urine Present (A) NEG^Negative /LPF    Hyaline Casts 12 (H) 0 - 2 /LPF    Uric Acid Crystals Moderate (A) NEG^Negative /HPF   Care Transition RN/SW IP Consult    Narrative    Angie, Chery     5/6/2019  1:33 PM  CARE TRANSITION SOCIAL WORK INITIAL ASSESSMENT:      Met with: Patient and Family.    DATA  Active Problems:    * No active hospital problems. *        ASSESSMENT  Cognitive Status: awake, alert and oriented.      Insurance Concerns: No Insurance issues identified     This writer met with pt and family, introduced self and role.   Discussed discharge planning and medicare guidelines in regards   to home care and SNF benefits.  Patient in ED Room 11 with new CA   diagnosis.  Patient was found down in his apt for an unknown   amount on time.  Patient states he uses a cane with ambulation at   baseline.  Patient will have a physical therapy evaluation around   1445 to determine safe disposition.  Patient wishes to return   home and is open to home care and palliative care.  Family also   requesting NH referrals be placed in the local area in case   patient is not able to return home.  NH referrals sent to:  [facility] (Admissions: [phone] Main Phone: [phone]   Fax: [phone]), [facility] (Main Phone:   [phone] Admissions Phone: [phone] Fax: [phone]),   [facility]  (Phone: [phone] Fax [phone]), and   [facility] (Admissions Phone: [phone]   Main Phone: [phone] Fax: [phone]).  Family is aware of   the private costs for NH.    PLAN    Home with Palliative Care vs NHP    LOURDES Trujillo  [facility] [phone]/ [facility] [phone]               Medications   0.9% sodium chloride BOLUS (0 mLs Intravenous Stopped 5/6/19 1149)     Followed by   sodium chloride 0.9% infusion (has no administration in time range)   ondansetron (ZOFRAN) injection 4 mg (has no administration in time range)   sodium chloride  0.9% infusion ( Intravenous Stopped 5/6/19 0928)   iopamidol (ISOVUE-370) solution 500 mL (80 mLs Intravenous Given 5/6/19 0903)   new 100 ml saline bag (60 mLs Intravenous Given 5/6/19 0903)   sodium chloride (PF) 0.9% PF flush 3 mL (3 mLs Intracatheter Given 5/6/19 0902)     10:54 AM--I sat down and discussed the patient's CT scan findings with the patient and his family-son and daughter-in-law.  I explained to them that he has advanced metastatic cancer.  I informed them that stage IV cancer is rarely if ever curable.  I do not know if oncology would even offer him chemotherapy.  He is in a very weakened and debilitated state.  I recommended the patient and family discussed this.  He is too weak to go back to his apartment so will need some type of placement.  I also offered hospice care.    12:28 PM--services has seen the patient.  They are now waiting for a physical therapy evaluation to ascertain if the patient will be able to go home.        Assessments & Plan (with Medical Decision Making)   MDM--78-year-old male who is brought to the emergency room after laying on his floor for 18 hours unable to get himself up due to weakness.  I did a more extensive evaluation.  He was found to have a liver mass with his last ED visit on the third.  CT scan confirms that he has involvement of his lung liver extensive adenopathy and bone.  I suspect this is a metastatic lung cancer as the liver appears to be metastatic.  He has had a rapid decline in the past couple of months with a marked weight loss and severe weakness.  He was previously living alone at home and he wants to go back home but I feel he is unable.  Family is unable or unwilling to assist 24/7 which is what he would need.   met with he and the family as did physical therapy.  Patient is still ambulatory but needs a lot of assistance.  Nursing reports the need for assistance of 2 people.  I think he needs nursing home level of care.  The  patient and family are not interested in pursuing chemotherapy or aggressive measures.  I would feel he would certainly qualify for hospice and this was mentioned and briefly discussed.  At this point he would be an observational admit.  I have reviewed the nursing notes.    I have reviewed the findings, diagnosis, plan and need for follow up with the patient.          Medication List      There are no discharge medications for this visit.         Final diagnoses:   Metastatic cancer (H)   Weakness   Falling episodes       5/6/2019   [facility] EMERGENCY DEPARTMENT     Husam, Parag LANDIS MD  05/06/19 0895

## 2019-05-06 NOTE — PLAN OF CARE
[facility]        OUTPATIENT PHYSICAL THERAPY FUNCTIONAL EVALUATION  PLAN OF TREATMENT FOR OUTPATIENT REHABILITATION  (COMPLETE FOR INITIAL CLAIMS ONLY)  Patient's Last Name, First Name, M.I.  YOB: 1940  Bhaskar Carrillo     Provider's Name   [facility]   Medical Record No.  [id number]     Start of Care Date:  05/06/19   Onset Date:  05/06/19   Type:     _X__PT   ____OT  ____SLP Medical Diagnosis:  weakness, fall     PT Diagnosis:  muscle weakness, impaired balance Visits from SOC:  1                              __________________________________________________________________________________  Plan of Treatment/Functional Goals:  balance training, gait training, bed mobility training, ROM, strengthening    Goals: See Physical Therapy Goals on Care Plan in [facility] electronic health record.    Therapy Frequency:  5 times/week   Predicted Duration of Therapy Intervention:  1-2 days    Kathleen Arredondo DPT, PT, ATC                                  I CERTIFY THE NEED FOR THESE SERVICES FURNISHED UNDER        THIS PLAN OF TREATMENT AND WHILE UNDER MY CARE     (Physician co-signature of this document indicates review and certification of the therapy plan).                Certification Date From:  05/06/19   Certification Date To:  05/11/19    Referring Provider:  Dr. Weiner    Initial Assessment  See Epic Evaluation- Start of Care Date: 05/06/19    Thank you for your referral.    Kathleen Arredondo, PT, DPT, ATC    [facility]    O: [phone]  E: [email or web address]

## 2019-05-06 NOTE — ED NOTES
ED Nursing criteria listed below was addressed during verbal handoff:     Abnormal vitals: No  Abnormal results: Yes  Med Reconciliation completed: Yes- Has not taken medications for some time  Meds given in ED: Yes  Any Overdue Meds: No  Core Measures: N/A  Isolation: N/A  Special needs: Yes  Skin assessment: Yes- very dry scaly skin    Observation Patient  Education provided: Yes    To 252

## 2019-05-06 NOTE — CONSULTS
CARE TRANSITION SOCIAL WORK INITIAL ASSESSMENT:      Met with: Patient and Family.    DATA  Active Problems:    * No active hospital problems. *        ASSESSMENT  Cognitive Status: awake, alert and oriented.      Insurance Concerns: No Insurance issues identified     This writer met with pt and family, introduced self and role. Discussed discharge planning and medicare guidelines in regards to home care and SNF benefits.  Patient in ED Room 11 with new CA diagnosis.  Patient was found down in his apt for an unknown amount on time.  Patient states he uses a cane with ambulation at baseline.  Patient will have a physical therapy evaluation around 1445 to determine safe disposition.  Patient wishes to return home and is open to home care and palliative care.  Family also requesting NH referrals be placed in the local area in case patient is not able to return home.  NH referrals sent to:  [facility] (Admissions: [phone] Main Phone: [phone] Fax: [phone]), [facility] (Main Phone: [phone] Admissions Phone: [phone] Fax: [phone]), [facility]  (Phone: [phone] Fax [phone]), and [facility] (Admissions Phone: [phone] Main Phone: [phone] Fax: [phone]).  Family is aware of the private costs for NH.    PLAN    Home with Palliative Care vs NHP    LOURDES Trujillo  [facility] [phone]/ [facility] [phone]

## 2019-05-06 NOTE — PROGRESS NOTES
05/06/19 1400   Quick Adds   Quick Adds Certification   Type of Visit Initial OP PT Evaluation       Present No   General Information   Start of Care Date 05/06/19   Referring Physician Dr. Weiner   Orders Evaluate and Treat as Indicated   Additional Orders assess for discharge safety   Order Date 05/06/19   Medical Diagnosis weakness   Onset of illness/injury or Date of Surgery 05/06/19   Precautions/Limitations fall precautions   Surgical/Medical history reviewed Yes   Pertinent history of current problem (include personal factors and/or comorbidities that impact the POC) Patient is a 78 year old male brought to the ED by EMS after being found down for an unknown duration after an unwitnessed fall. Patient seen in the emergency department for assessment to assist with discharge planning. Patient recently diagnosed with advanced metastatic cancer with a previous medical history of HTN, CKD, smoking, COPD, hyperlipidemia, weight loss.    Pertinent Visual History  Patient frail lying in bed. three family members present upon PT arrival, however two left upon initation of assessment. Patient jaundice in the eyes with dry scaling skin throughout dermis   Prior level of functional mobility Transfers;Ambulation;ADL   Transfers IND   Ambulation SPC for ambulation of 1/4 mile   ADL IND   Current Community Support   (Neighbors check in)   Living environment Apartment/condo   Home/Community Accessibility Comments elevator access, denies assistance in the home. Sleeps in a flat bed, walk in shower, grab bars in the shower and uses surfaces for toilet transfer.    Current Assistive Devices Standard Cane   ADL Devices Shower/Tub Grab Bar;Wall Grab Bar   Patient/Family Goals Statement Patient: go home.   Fall Risk Screen   Fall screen completed by PT   Have you fallen 2 or more times in the past year? No   Have you fallen and had an injury in the past year? No   Is patient a fall risk? Yes   Fall screen  comments Patient admits to one fall in the past year, denies injury however was too weak to rise from the ground. Patient with impaired static balance unsupported, significant improvement with walker.    Pain   Patient currently in pain No   Cognitive Status Examination   Orientation orientation to person, place and time   Level of Consciousness alert   Follows Commands and Answers Questions 75% of the time;able to follow single-step instructions   Personal Safety and Judgment intact   Memory intact   Cognitive Comment Patient hard of hearing and slow to answer questions however appropriate   Observation   Observation Lying in bed covered upon PT arrival, performs minimal tasks without encouragement. Flat affect of patient and family. Patient incontinent lying in bed and voided without control with supine to sitting transfer. Patient did not acknowledge incontinence.    Integumentary   Integumentary Comments dry scaly throughout   Posture   Posture Forward head position;Protracted shoulders;Kyphosis   Range of Motion (ROM)   ROM Comment Bilat UE and LE WFL   Strength   Strength Comments patient with good UE break strength to manual muscle testing, however poor endurance. Bilat LE 4-/5 SLR hip flexion, hip extension and hip abduction, also poor muscular endurance.   Bed Mobility   Bed Mobility Bed mobility skill: Rolling/Turning;Bed mobility skill: Scooting/Bridging;Bed mobility skill: Supine to sit   Bed Mobility Skill: Rolling/Turning   Level of [place]: Rolling/Turning stand-by assist   Physical/Nonphysical Assist: Rolling/Turning supervision   Assistive Device: Rolling/Turning bed rails   Bed Mobility Skill: Scooting/Bridging, Rehab Eval   Level of [place]: Scooting/Bridging independent   Bed Mobility Skill: Supine to Sit   Level of [place]: Supine/Sit moderate assist (50% patients effort)   Physical Assist/Nonphysical Assist: Supine/Sit supervision;verbal cues;1 person assist   Assistive Device:  Supine/Sit   (blocked to prevent falling out of bed, IND trunk control)   Transfer Skills   Transfer Transfer Skill: Sit/Stand;Transfer Safety Analysis Sit/Stand;Transfer Skill:  Stand to Sit;Transfer Safety Analysis Stand/Sit   Transfer Skill: Sit to Stand   Level of [place]: Sit to Stand stand-by assist   Physical/Nonphysical Assist: Sit to Stand supervision   Weighbearing Restrictions: Sit to Stand full weight-bearing   Assistive Device: Sit to Stand rolling walker  (VC for hand placement )   Transfer Safety Analysis Sit to Stand   Impairments Contributing to Impaired Transfers: Sit to Stand decreased strength;impaired balance   Transfer Skill: Stand to Sit   Level of [place]: Stand to Sit stand-by assist   Physical/Nonphysical Assist: Stand to Sit supervision   Weighbearing Restrictions: Stand to Sit full weight-bearing   Assistive Device: Stand to Sit ebonie walker  (VC for hand placement)   Transfer Safety Analysis Stand/Sit   Transfer Safety Concerns Noted: Stand to Sit losing balance backward   Impairments Contributing to Impaired Transfers: Stand to Sit decreased strength;impaired balance   Gait   Gait Gait Skill;Gait Analysis   Gait Skills   Level of [place]: Gait stand-by assist   Physical Assist/Nonphysical Assist: Gait supervision   Weight-Bearing Restrictions: Gait full weight-bearing   Assistive Device for Transfer: Gait rolling walker   Gait Distance 75 feet   Gait Analysis   Gait Pattern Used swing-through gait   Gait Deviations Noted decreased toe-to-floor clearance;decreased step length;decreased stride length;increased time in double stance;decreased soledad   Impairments Contributing to Gait Deviations decreased strength;impaired balance   Balance   Balance Comments Did not formally assess balance, static standing with sway at EOB, provided walker with improved static and dynamic gait   Sensory Examination   Sensory Perception no deficits were identified   Muscle Tone   Muscle Tone  Comments Low tone throughout   Planned Therapy Interventions   Planned Therapy Interventions balance training;gait training;bed mobility training;ROM;strengthening   Clinical Impression   Criteria for Skilled Therapeutic Interventions Met yes, treatment indicated   PT Diagnosis muscle weakness, impaired balance   Influenced by the following impairments recent fall, cancer processes and chronic decline in functional mobility   Functional limitations due to impairments decreased safety with transitional movements, decreased activity tolerance   Clinical Presentation Stable/Uncomplicated   Clinical Presentation Rationale Patient without change in status or pain with functional mobility assessment.    Clinical Decision Making (Complexity) Low complexity   Therapy Frequency 5 times/week   Predicted Duration of Therapy Intervention (days/wks) 1-2 days   Risk & Benefits of therapy have been explained Yes   Patient, Family & other staff in agreement with plan of care Yes   Clinical Impression Comments Patient tolerated today's assessment well. Given patient's recent diagnosis and fall at home 24/7 assistance is recommended. Attempted to discuss with family patient discharging home with family support and 24/7 assistance and patient refuse discussion, also attempted to discuss increase check ins as well as neighbors staying with patient and family refuse this discussion. Patient without current functional mobility needs, however given medical diagnosis, lack of family assistance, recent fall and prognosis of disease pathology patient would benefit from long term care placement for safe disposition and medical management.   Education Assessment   Preferred Learning Style Demonstration   Barriers to Learning Emotional;Cognitive   GOALS   PT Eval Goals   (see inpatient plan of care for goals )   Total Evaluation Time   PT Luis, Low Complexity Minutes (87915) 20   Therapy Certification   Certification date from 05/06/19    Certification date to 05/11/19   Medical Diagnosis weakness, fall   Certification I certify the need for these services furnished under this plan of treatment and while under my care.  (Physician co-signature of this document indicates review and certification of the therapy plan).     Thank you for your referral.    Kathleen Arredondo, PT, DPT, ATC    [facility]    O: [phone]  E: [email or web address]

## 2019-05-06 NOTE — ED NOTES
Pt's family has spoke with Chery in Care transitions a few different times today.  Family is also requesting for [facility] to be an option of nursing home.  They expressed their top 2 requests to be [facility] and [facility].

## 2019-05-07 PROBLEM — N17.9 ACUTE KIDNEY INJURY SUPERIMPOSED ON CHRONIC KIDNEY DISEASE (H): Status: ACTIVE | Noted: 2019-01-01

## 2019-05-07 PROBLEM — R63.8 DECREASED ORAL INTAKE: Status: ACTIVE | Noted: 2019-01-01

## 2019-05-07 PROBLEM — M62.81 GENERALIZED MUSCLE WEAKNESS: Status: ACTIVE | Noted: 2019-01-01

## 2019-05-07 PROBLEM — N18.9 ACUTE KIDNEY INJURY SUPERIMPOSED ON CHRONIC KIDNEY DISEASE (H): Status: ACTIVE | Noted: 2019-01-01

## 2019-05-07 PROBLEM — R33.9 URINARY RETENTION: Status: ACTIVE | Noted: 2019-01-01

## 2019-05-07 NOTE — PROGRESS NOTES
05/07/19 1535   Quick Adds   Type of Visit Initial Occupational Therapy Evaluation   Living Environment   Lives With alone   Living Arrangements apartment   Home Accessibility no concerns  (elevator access)   Transportation Anticipated family or friend will provide   Living Environment Comment Pt lives alone in an apartment with elevator access. He has a walk in shower with shower chair and grab bars in shower. Pt reports his son checks in on him weekly and his neighbors also check in on him.    Self-Care   Usual Activity Tolerance moderate   Current Activity Tolerance poor   Regular Exercise No   Equipment Currently Used at Home shower chair;grab bar, toilet;grab bar, tub/shower   Functional Level   Ambulation 1-->assistive equipment   Transferring 1-->assistive equipment   Toileting 0-->independent   Bathing 0-->independent   Dressing 0-->independent   Eating 0-->independent   Communication 0-->understands/communicates without difficulty   Swallowing 0-->swallows foods/liquids without difficulty   Cognition 0 - no cognition issues reported   Fall history within last six months yes   Number of times patient has fallen within last six months 1   Which of the above functional risks had a recent onset or change? cognition;ambulation;transferring;fall history  (medical needs)   General Information   Onset of Illness/Injury or Date of Surgery - Date 05/06/19   Referring Physician Omaira Singh MD   Patient/Family Goals Statement Return home   Additional Occupational Profile Info/Pertinent History of Current Problem  Pt is a 78 year old male being seen for OT evaluation after being admitted via ED after being found down after an unwitnessed fall. Pt was recently diagnosed with advanced metastatic cancer. Pt has a previous medical history of COPD, CKD, HTN, and hyperlipidemia   Precautions/Limitations fall precautions   Cognitive Status Examination   Orientation person;place   Level of Consciousness  "lethargic/somnolent;confused   Follows Commands (Cognition) delayed response/completion;increased processing time needed;repetition of directions required   Memory impaired   Executive Function Self awareness/monitoring impaired;Cognitive flexibility impaired;Initiation impaired   Cognitive Comment Pt significantly fatigued during session, therefore no formal cognitive screen completed this date. Pt was an inconsistently historian and oriented to person and place.    Pain Assessment   Patient Currently in Pain No   Bathing   Level of [place] minimum assist (75% patients effort)   Physical Assist/Nonphysical Assist 1 person assist;2 person assist   Assistive Device shower chair;grab bars;detachable shower head   Upper Body Dressing   Level of [place]: Dress Upper Body independent   Lower Body Dressing   Level of [place]: Dress Lower Body minimum assist (75% patients effort)   Physical Assist/Nonphysical Assist: Dress Lower Body 1 person assist   Toileting   Level of [place]: Toilet minimum assist (75% patients effort)   Physical Assist/Nonphysical Assist: Toilet 1 person assist;2 person assist   Assistive Device grab bars   Grooming   Level of [place]: Grooming stand-by assist   Physical Assist/Nonphysical Assist: Grooming set-up required;supervision   Eating/Self Feeding   Level of [place]: Eating independent   Instrumental Activities of Daily Living (IADL)   Previous Responsibilities meal prep;housekeeping;laundry;medication management;finances;driving   IADL Comments Pt reported \"my son drives me\" however then stated \"I drive to the store on occasion\"   Activities of Daily Living Analysis   Impairments Contributing to Impaired Activities of Daily Living cognition impaired;strength decreased   General Therapy Interventions   Planned Therapy Interventions ADL retraining;IADL retraining;cognition;strengthening   Clinical Impression   Criteria for Skilled Therapeutic Interventions Met " "yes, treatment indicated   OT Diagnosis Decreased independence and safety with ADL impacting ability to live independently in his home environment   Influenced by the following impairments weakness; cognition impaired; medical needs   Assessment of Occupational Performance 3-5 Performance Deficits   Identified Performance Deficits dressing, bathing, toileting, functional mobility, home mgmt   Clinical Decision Making (Complexity) Low complexity   Therapy Frequency daily   Predicted Duration of Therapy Intervention (days/wks) 1-3 days   Anticipated Equipment Needs at Discharge sock aide;dressing equipment   Anticipated Discharge Disposition Transitional Care Facility;Home with Home Therapy   Risks and Benefits of Treatment have been explained. Yes   Patient, Family & other staff in agreement with plan of care Yes   Clinical Impression Comments Pt would benefit from skilled inpatient OT services to address above concerns for safe d/c disposition, to address cognition, and to increase independence with ADL   [facility] TM \"6 Clicks\"   2016, Trustees of [facility], under license to [email or web address].  All rights reserved.   6 Clicks Short Forms Daily Activity Inpatient Short Form   [facility]  \"6 Clicks\" Daily Activity Inpatient Short Form   1. Putting on and taking off regular lower body clothing? 3 - A Little   2. Bathing (including washing, rinsing, drying)? 3 - A Little   3. Toileting, which includes using toilet, bedpan or urinal? 3 - A Little   4. Putting on and taking off regular upper body clothing? 4 - None   5. Taking care of personal grooming such as brushing teeth? 3 - A Little   6. Eating meals? 4 - None   Daily Activity Raw Score (Score out of 24.Lower scores equate to lower levels of function) 20   Total Evaluation Time   Total Evaluation Time (Minutes) 18     GILDARDO Calabrese/L  [facility]  [phone]  "

## 2019-05-07 NOTE — PROVIDER NOTIFICATION
Informed Dr. Quinn that pt is feeling like he has to void and is getting out of bed frequently to try to void and is unable.  Bladder scan is 299cc.  Doctor gave a one time straight cath order.

## 2019-05-07 NOTE — PROGRESS NOTES
DATE: 5/7/2019    TIME OF RECEIPT FROM LAB: 0859  LAB TEST:  Lactic acid  LAB VALUE:  2.9  RESULTS GIVEN WITH READ-BACK TO (PROVIDER): yes  TIME LAB VALUE REPORTED TO PROVIDER:   0859  NEW ORDERS: Liter bolus over 3 hours and repeat lactic acid @ 1300

## 2019-05-07 NOTE — PROGRESS NOTES
S-(situation): shift note    B-(background): metastatic cancer, fall and weakness at home    A-(assessment): pt is confused to time and situation. Denies pain, abdomen is distended, rounded, slightly firm but palpable, faint bowel sounds. Pt is up to the edge of the bed to stand with gait belt and walker, assist of 2. Pt unable to void, uncomfortable, bladder scan =299mL. Straight cathed for 400mL. BP 90s-100s/40s, other vss. Pt has drank water, first with a straw and started coughing, has drank better without a straw. Lactic now this morning is 3.2, charge nurse notifying Dr. Quinn of results.     R-(recommendations): await orders for elevated lactic acid, PT today, monitor swallowing.

## 2019-05-07 NOTE — ASSESSMENT & PLAN NOTE
Presents after a fall at home, on the ground for 18 hours with no obvious injury  Work-up is showing metastatic disease, possibly some mild dehydration  Seen by physical therapy and determined to be too weak to go home without supervision, family is unable to provide such  [place] observation, continue social service help with possible placement.  The question is whether any additional treatment is going to be chosen in regards to his cancer, he may benefit with hospice if he chooses not

## 2019-05-07 NOTE — PLAN OF CARE
Patient is alert and oriented X4 however intermittently confused with situation. Blood pressures running systolic 80-100s. Denies shortness of breathing, lightheadedness, and/or dizziness. Lungs diminished throughout with adequate oxygen saturations on room air. Noted to occasionally have coughing fits with drinking, encouraged to be upright without straws and awaiting speech to evaluate. PT to eval. OT to eval cognitive assessment. Lactic acid 2.9 with I liter bolus over 3 hours; repeat lactic 2.8. Will repeat labs later this evening. Up with stand by assist, gait belt and walker. Incontinent of urine frequently. Bowel sounds hypoactive with distended abdomen. Will continue to monitor.

## 2019-05-07 NOTE — PROGRESS NOTES
Patient switched to inpatient today.  Will continue to explore discharge options with patient/family.  Patient will receive therapy services today to help determine disposition.  Care Transitions to follow.    LOURDES Trujillo  [facility] [phone]/ [facility] [phone]    1430- Family expressed preferences for NHP to be the following:    - [facility]    - [facility]    - [place]    -[facility]    Referrals pending    LOURDES Trujillo  [facility] [phone]/ [facility] [phone]

## 2019-05-07 NOTE — PLAN OF CARE
Discharge Planner SLP   Patient plan for discharge: Unknown at this time.  Current status: Patient was seen for a clinical bedside swallow evaluation. Patient was observed during trials of thin liquid, nectar thick liquid, soft and hard solid consistencies. Patient presents with mild-moderate dysphagia characterized by prolonged mastication, premature loss of bolus over base of tongue prior to initiation of swallow, and evidence of pharyngeal residue. These symptoms decreased with nectar thick liquids and soft solids. SLP to follow during length of stay as appropriate to maximize safety of oral intake and educate patient and family regarding changes in swallow function.SLP to follow during length of stay as appropriate to maximize safety of oral intake and educate patient and family regarding changes in swallow function.  Barriers to return to prior living situation: safety with oral intake, modified diet  Recommendations for discharge: Recommend dysphagia level 3 diet with nectar thick liquid and pills whole in food carrier or with nectar thick liquids. Swallow strategies: No straw, sit fully upright for PO intake, small sips/bites, hard swallow. Recommend SLP follow patient at next level of care to monitor safety with oral intake.  Rationale for recommendations: To maximize safety with oral intake and reduce risk of aspiration.       Entered by: Ramona Bullock 05/07/2019 4:42 PM

## 2019-05-07 NOTE — PLAN OF CARE
Disoriented to time and place. Generalized weakness. Dry, scaly and flaky skin. 5mg Roxicodone given at 1830 for back pain. VSS on RA. Intermittent incontinence. Ambulated to bathroom; assist of 2. Able to turn in bed independently.

## 2019-05-07 NOTE — PLAN OF CARE
Discharge Planner OT   Patient plan for discharge: Home  Current status: OT eval completed and treatment initiated. Pt is a 78 year old male being seen for OT evaluation after being admitted via ED after being found down after an unwitnessed fall. Pt was recently diagnosed with advanced metastatic cancer. Pt has a previous medical history of COPD, CKD, HTN, and hyperlipidemia. Pt lives alone in an apartment with elevator access. He has a walk in shower with shower chair and grab bars in shower. Pt reports his son checks in on him weekly and his neighbors also check in on him. Pt currently requires min-mod assist of 1-2 for ADL, including toileting, bathing, dressing, and functional mobility. Pt significantly fatigued during session, therefore no formal cognitive screen completed this date. Pt was an inconsistently historian and oriented to person and place.   Barriers to return to prior living situation: Level of assist; lives alone; cognition; medical needs; decreased strength and activity tolerance  Recommendations for discharge: TCU  Rationale for recommendations: Pt would benefit from further skilled OT services within the TCU setting to increase independence and safety with ADL and for safe discharge disposition given pt's medical diagnosis and medical needs       Entered by: Ramona Logan 05/07/2019 3:59 PM

## 2019-05-07 NOTE — ASSESSMENT & PLAN NOTE
Seen in the ER May 3 with abnormal liver function tests, weight loss with ultrasound showing liver mass  CT imaging today is showing metastatic disease involving the right lower lobe of the lung, multiple metastatic lesions in the liver, lytic lesion in the right rib and probable adrenal mets.  Primary source is not clear  Patient is quite sleepy at this time, unable to really discuss with them what he would like to pursue in terms of treatment  It was implied in the ER that he and family might choose more comfort care status and might consider hospice evaluation

## 2019-05-07 NOTE — ASSESSMENT & PLAN NOTE
Not taking his meds for the past few weeks, recently on hydrochlorothiazide and lisinopril  Pressures are low, hold these for now

## 2019-05-07 NOTE — PLAN OF CARE
Discharge Planner PT   Patient plan for discharge: Unknown  Current status: Supine lying in bed, poorly positioned at the foot of the bed with HOB flexed, unable to manage bed control and did not use call light for assistance. Bed mobility patient using overhead trapeze for all mobility, did not heed cues to attempt without, MOD IND rolling and supine to/from sitting EOB. Sit to/from stand SBA with 2WW. Ambulated 10 ft in room, 2WW, SBA, refused greater mobilization. Returned to bed and refused strengthening tasks in bed. Set up comfortably in bed to rest.   Barriers to return to prior living situation: lack of assistance, decreased activity tolerance, impaired insight  Recommendations for discharge: TCU  Rationale for recommendations: Patient with noted decline from functional mobility assessment 5/6/19, continues to complete tasks without physical assistance however decreased energy and activity tolerance throughout. Patient would benefit from placement for safe disposition, anticipate progression to long term cares given medical diagnosis and prognosis.        Entered by: Kathleen Arredondo 05/07/2019 3:14 PM

## 2019-05-07 NOTE — PROGRESS NOTES
"SPIRITUAL HEALTH SERVICES  SPIRITUAL ASSESSMENT Progress Note  [facility] [place]      PRIMARY FOCUS:     Emotional/spiritual/[employer] distress    Support for coping - Pt request    ILLNESS CIRCUMSTANCES:     Reviewed documentation.     Context of Serious Illness/Symptoms - Fall/new cancer diagnosis    Resources for Support - Son & daughter-in-law - they were present when  met pt during Rounding, but not present when  met with pt.    DISTRESS:     Emotional/Spiritual/Existential Distress - Pt stated the Hospitalist had \"laid it all out clearly\" and \"it is what it is.\"    Restorationism Distress - Not Applicable    Social/Cultural/Economic Distress - Unknown    SPIRITUAL/Orthodox COPING:     Orthodoxy/Megan - None - Pt indicated he did not believe in either a Higher Power or afterlife.  He said he did not have any issues to discuss.    Spiritual Practice - None    GOALS OF CARE:    Goals of Care - Address the cancer diagnosis.    Meaning/Sense-Making - Not Discussed    PLAN:  is available for pt/family needs.    Jah Mejia M.Div., [facility]  Staff   Office tel: [phone]    "

## 2019-05-07 NOTE — PROGRESS NOTES
[facility]    Medicine Progress Note - Hospitalist Service       Date of Admission:  5/6/2019  Assessment & Plan   Principal Problem:    Acute kidney injury superimposed on chronic kidney disease (H)    Assessment:  On review of his chart baseline creatinine is 1.2-1.4 and current creatinine is 1.84 (increased from 1.82 despite fluid bolusing given in the ED and maintenance fluids given overnight.  Patient has very poor oral intake further complicated by patient coughing every time he attempts to swallow and concern for underlying dysphasia and at this time it is not felt that patient could maintain adequate hydration if IV fluids were to be discontinued.    Plan: We will transition patient to inpatient status, performed fluid bolus at this time and recheck a creatinine later today, continue with maintenance fluids.  Will perform speech therapy swallow evaluation to evaluate for underlying dysphasia and initiate a diet appropriate to avoid aspiration if concern is present.  Given patient's minimal appetite despite strong encouragement to take more, worsening renal function despite IV fluid resuscitation performed during his observation stay, as well as apparent increase in weakness as patient is now needing an assistive to instead of standby assist as was noted in the emergency room physical therapy evaluation, at this time it is expected patient will be hospitalized for at least 2 midnights for further evaluation and management.    Active Problems:    Elevated lactic acid    Assessment:  Lactic acid was elevated at 3.2 this morning - fluid boluses given but despite this lactic is only improved to 2.9.  No known infection is presents at this time but patient does have leukocytosis with low normal blood pressures despite home blood pressure regimen being held in the setting of patient with acute kidney injury.    Plan: Elevated lactic acid may be secondary to dehydration from the acute  kidney injury versus early infection that has not yet been detected.  We will perform another fluid bolus now and recheck lactic acid at 1500.  We will perform procalcitonin to evaluate for any underlying bacterial sepsis that may be present and monitor closely for any signs concerning for  clinical worsening.      Decreased oral intake    Assessment: Patient has had minimal oral intake overnight (less than 100 ml despite encouragement), and nursing staff has noted patient coughs with any liquids.    Plan:  will proceed with inpatient admission, follow evaluation, encouragement of appropriate diet but will continue with increased fluid resuscitation as above until patient is able to improve improving intake.  We will have dietitian meet with patient for further evaluation      Urinary retention    Assessment: Patient reports that he does have frequent small voids in the home setting the patient did need straight cath x1 overnight secondary to significant retention.  It is not known if he has BPH at baseline, as patient does not normally go to the doctor, however is suspected    Plan: Continue to monitor urinary output and perform post void bladder scan residual if there is concern for recurrent urinary retention.  Will consider repeat straight cath if this occurs      Metastatic cancer (H)    Assessment: New diagnosis, unknown primary, with metastatic lesions being found in the liver, adrenal glands, bone, lungs and lymph nodes    Plan: At this time patient is not sure how aggressively he would want to pursue treatment, and does not even know if he wants a liver biopsy to be performed to confirm diagnosis.  Patient and his family will discuss this more today and ongoing conversation will occur tomorrow regarding goals of care for the future      Fall at home, initial encounter    Assessment: With patient's unable to get up independently and was on the ground for approximately 18 hours before the neighbor discovered  him.  No known fractures, no evidence of rhabdomyolysis     Plan: will have physical therapy and Occupational Therapy work with patient to evaluate safe disposition plan following this hospitalization.      Generalized muscle weakness    Assessment: Likely contributing to fall as above and is likely multifactorial in etiology including metastatic cancer diagnosis, acute kidney injury    Plan: Proceed with PT and OT evaluation as above      Protein-calorie malnutrition (H)    Assessment: Likely secondary to poor oral intake with metastatic cancer    Plan: We will proceed with dietitian referral as above      Tobacco use disorder    Assessment:  Patient declines need for nicotine patch    Plan: We will continue to monitor and initiate if desired      Advanced directives, counseling/discussion    Assessment: DNR/DNI    Plan: Will honor      Hypertension goal BP (blood pressure) < 140/90    Assessment: Present at baseline with patient normally on hydrochlorothiazide and lisinopril however blood pressures have been low normal in the  range during this hospitalization despite home medication being held.  This likely is secondary to patient's significant weight loss    Plan: We will continue to hold home regimen and monitor for blood pressure stability      CKD (chronic kidney disease) stage 3, GFR 30-59 ml/min (H)    Assessment: with acute worsening as above    Plan: proceed with plan as above       Diet: Regular Diet Adult    DVT Prophylaxis: Pneumatic Compression Devices  Hansen Catheter: not present  Code Status: DNR/DNI      Disposition Plan   Expected discharge: 2 - 3 days, recommended to Home with home care vs TCU vs long term care with hospice depending on patient's progression during this hospitalization and goals of care going forward.  Entered: Omaira Singh MD 05/07/2019, 10:50 AM       Today, over 35 minutes was spent face to face with patient and family discussing the recent CT results,  options for investigation of cancer type going forward, goals of care, coughing with swallowing with poor oral intake, urinary issues, etc.  Over 50 minutes was spent on patients care throughout the day.      The patient's care was discussed with the Bedside Nurse, Patient and Patient's Family.    Omaira Singh MD  Hospitalist Service  [facility]    ______________________________________________________________________    Interval History   Patient had poor oral intake overnight but when he did drink he was noted by nursing staff to cough with concern for aspiration.  Vitally he remained stable but needed a assist of 2 with maximum assist in order to provide safety and mobility.  Renal function has worsened today despite fluid resuscitation and patient's intake continues to be poor this morning    Data reviewed today: I reviewed all medications, new labs and imaging results over the last 24 hours.    Physical Exam   Vital Signs: Temp: 96.9  F (36.1  C) Temp src: Oral BP: (!) 93/34 Pulse: 88   Resp: 20 SpO2: 93 % O2 Device: None (Room air)    Weight: 158 lbs 11.7 oz  Constitutional: awake, alert, cooperative to exam, no apparent distress, and appears stated age  Respiratory: No increased work of breathing, good air exchange, clear to auscultation bilaterally, no crackles or wheezing  Cardiovascular: regular rate and rhythm  GI: bowel sounds present, abdomen obese but no distention or pain on palpation, ongoing firmness on palpation of the right upper quadrant  Musculoskeletal: no lower extremity pitting edema present  Neurologic: awake, alert, oriented to person and place and time and overall to situation but at this point unclear of where some of this thought process is coming from and he seems unable to articulate some of his reasoning    Data   Recent Labs   Lab 05/07/19  0520 05/06/19  0754 05/03/19  1334 05/03/19  1311   WBC 14.5* 17.4* 12.8*  --    HGB 9.4* 12.7*  13.3  --    MCV 96 95 94  --    * 165 158  --     140  --  135   POTASSIUM 4.6 4.8  --  4.6   CHLORIDE 108 102  --  101   CO2 19* 19*  --  22   * 87*  --  65*   CR 1.84* 1.82*  --  1.59*   ANIONGAP 12 19*  --  12   RIZWANA 8.2* 9.6  --  8.7   GLC 83 79  --  100*   ALBUMIN  --  2.5*  --  2.4*   PROTTOTAL  --  5.6*  --  5.8*   BILITOTAL  --  6.5*  --  5.3*   ALKPHOS  --  639*  --  594*   ALT  --  243*  --  128*   AST  --  319*  --  155*   LIPASE  --   --   --  748*   TROPI  --   --   --  <0.015

## 2019-05-07 NOTE — H&P
[facility]    History and Physical - Hospitalist Service       Date of Admission:  5/6/2019    Assessment & Plan   Bhaskar Carrillo is a 78 year old male admitted on 5/6/2019. He he presents via EMS after his daughter called 911.  He slipped from a chair yesterday was unable to get up and was on the floor for 18 hours.  Evaluation in the emergency department is showing no obvious injury.  He was seen 3 days prior and noted to have a lung mass with elevated liver function tests.  Imaging today is showing right lower lobe lung masses with liver metastasis, lytic lesions in the right rib and adrenal masses.  He had a 30 pound weight loss and after evaluation by PT he is too weak to go safely home.   is seeing him with no clear disposition determined.  Patient will be registered observation and brought in for cares.   will continue to work on disposition for tomorrow.  Patient is very weak and cannot really make decisions tonight regarding what he would like to do in regards of the cancer although earlier in the ER he was insinuated that he might be going more for comfort care status.    Weakness  Presents after a fall at home, on the ground for 18 hours with no obvious injury  Work-up is showing metastatic disease, possibly some mild dehydration  Seen by physical therapy and determined to be too weak to go home without supervision, family is unable to provide such  [place] observation, continue social service help with possible placement.  The question is whether any additional treatment is going to be chosen in regards to his cancer, he may benefit with hospice if he chooses not    Metastatic cancer (H)  Seen in the ER May 3 with abnormal liver function tests, weight loss with ultrasound showing liver mass  CT imaging today is showing metastatic disease involving the right lower lobe of the lung, multiple metastatic lesions in the liver, lytic lesion in the  right rib and probable adrenal mets.  Primary source is not clear  Patient is quite sleepy at this time, unable to really discuss with them what he would like to pursue in terms of treatment  It was implied in the ER that he and family might choose more comfort care status and might consider hospice evaluation    Hypertension goal BP (blood pressure) < 140/90  Not taking his meds for the past few weeks, recently on hydrochlorothiazide and lisinopril  Pressures are low, hold these for now    CKD (chronic kidney disease) stage 3, GFR 30-59 ml/min (H)  Baseline creatinine 1.4, slightly elevated 1.82  Most likely due to not drinking  IV fluids tonight, recheck tomorrow    Fall at home, initial encounter  Loss of balance, slipped from chair yesterday, unable to get up  Seen by physical therapy, too weak to safely go home  Placement being pursued    Protein-calorie malnutrition (H)  Describes a 30 pound weight loss over the past couple of months  He seems mildly cachectic with known metastatic cancer  Await decision about his choice for the future, consider nutrition consult    Tobacco use disorder  Describes himself as a light smoker  Declines nicotine replacement at this time    Advanced directives, counseling/discussion  Discussed and he would like to be DNR/DNI         Diet: Regular Diet Adult    DVT Prophylaxis: Low Risk/Ambulatory with no VTE prophylaxis indicated  Hansen Catheter: not present  Code Status: DNR/DNI      Disposition Plan   Expected discharge: Tomorrow, recommended to transitional care unit once safe disposition plan/ TCU bed available.  Entered: Henri Quinn MD 05/06/2019, 9:30 PM     The patient's care was discussed with the Patient.    Henri Quinn MD  [facility]    ______________________________________________________________________    Chief Complaint   78-year-old male who fell at home and could not get up    History is obtained from the patient,  electronic health record and emergency department physician    History of Present Illness   Bhaskar Carrillo is a 78 year old male who was found by neighbor on the ground this morning, unable to get up.  He states that he slipped from a chair yesterday and and spent 18 hours on the floor crawling around, unable to get up.  Denied any injuries.  States he is lost 30 pounds over the past couple months and had been seen in the ER 3 days ago with a diagnosis of probable metastatic disease in his liver.  This is supposed to be followed up in clinic by his primary care doctor, but this happen in here he has.  He denies fever or chills.  He said poor appetite.  He denies nausea vomiting or diarrhea or blood.  Patient is a smoker but denies shortness of breath or chest pain.  States his legs are hurting and he cannot get up.  In the ER was seen by physical therapy who thought that he needed to be have 24/7 help at home, family is unable to provide this and he is brought in for observation status and disposition.    Review of Systems    The 10 point Review of Systems is negative other than noted in the HPI or here.     Past Medical History    I have reviewed this patient's medical history and updated it with pertinent information if needed.   Past Medical History:   Diagnosis Date     Hypertension        Past Surgical History   I have reviewed this patient's surgical history and updated it with pertinent information if needed.  No past surgical history on file.    Social History   I have reviewed this patient's social history and updated it with pertinent information if needed.  Social History     Tobacco Use     Smoking status: Light Tobacco Smoker     Years: 50.00     Last attempt to quit: 3/12/2009     Years since quitting: 10.1     Smokeless tobacco: Never Used   Substance Use Topics     Alcohol use: No     Drug use: No       Family History   I have reviewed this patient's family history and updated it with pertinent  information if needed.   Family History   Problem Relation Age of Onset     Lipids Mother      Lipids Father        Prior to Admission Medications   Prior to Admission Medications   Prescriptions Last Dose Informant Patient Reported? Taking?   STATIN NOT PRESCRIBED, INTENTIONAL,   Yes No   Sig: by Other route continuous prn.   VENTOLIN  (90 Base) MCG/ACT inhaler More than a month at Unknown time  No No   Sig: INHALE 2 PUFFS BY MOUTH EVERY 4 HOURS AS NEEDED FOR SHORTNESS OF BREATH / DYSPNEA   hydrochlorothiazide (HYDRODIURIL) 25 MG tablet More than a month at Unknown time  No No   Sig: Take 1 tablet (25 mg) by mouth every morning   lisinopril (PRINIVIL/ZESTRIL) 20 MG tablet More than a month at Unknown time  No No   Sig: Take 1 tablet (20 mg) by mouth daily   lisinopril (PRINIVIL/ZESTRIL) 40 MG tablet More than a month at Unknown time  No No   Sig: Take 1 tablet (40 mg) by mouth daily      Facility-Administered Medications: None     Allergies   Allergies   Allergen Reactions     Simvastatin      Muscle pain,        Physical Exam   Vital Signs: Temp: 97.2  F (36.2  C) Temp src: Oral BP: 120/54 Pulse: 88   Resp: 20 SpO2: 95 % O2 Device: None (Room air)    Weight: 158 lbs 11.7 oz    Constitutional: awake, alert, cooperative, no apparent distress, and appears stated age  Eyes: Lids and lashes normal, pupils equal, round and reactive to light, extra ocular muscles intact, sclera clear, conjunctiva normal  ENT: normocepalic, without obvious abnormality, atramatic  Hematologic / Lymphatic: no cervical lymphadenopathy and no supraclavicular lymphadenopathy  Respiratory: No increased work of breathing, good air exchange, clear to auscultation bilaterally, no crackles or wheezing  Cardiovascular: regular rate and rhythm  GI: Firmness over his right upper quadrant but no pain.  Rest of the abdomen is unremarkable  Skin: no bruising or bleeding and normal skin color, texture, turgor  Musculoskeletal: There is no redness,  warmth, or swelling of the joints.  Full range of motion noted.  Motor strength is 5 out of 5 all extremities bilaterally.  Tone is normal.  Neurologic: Awake, alert, oriented to name, place and time.  Cranial nerves II-XII are grossly intact.  Patient is fatigued    Data   Data reviewed today: I reviewed all medications, new labs and imaging results over the last 24 hours. I personally reviewed .    Results for orders placed or performed during the hospital encounter of 05/06/19   CT Chest/Abdomen/Pelvis w Contrast    Narrative    CT CHEST/ABDOMEN/PELVIS WITH CONTRAST   5/6/2019 9:22 AM     HISTORY: Weakness, weight loss, intrahepatic mass, elevated liver  enzymes.    TECHNIQUE: CT of the chest, abdomen, and pelvis was completed with  administration of intravenous contrast. A total of 80 mL Isovue-370  was injected intravenously. Radiation dose for this scan was reduced  using automated exposure control, adjustment of the mA and/or kV  according to patient size, or iterative reconstruction technique.    COMPARISON: None.    FINDINGS:   Chest: Right lower lobe mass is present measuring 2.3 cm. More  inferior subpleural right lower lobe pulmonary nodule is present  measuring 1 cm. Posteromedial subpleural pulmonary nodule is present  measuring 1.5 cm. Right middle lobe pulmonary nodule is present  measuring 7 mm. Multiple smaller right-sided middle lobe pulmonary  nodules are present. Calcified left upper lobe pulmonary nodule is  present. Bulky right-sided hilar lymphadenopathy is present  collectively measuring up to 3.3 cm. Enlarged low right paratracheal  lymph node is present measuring 1.9 cm. No left-sided hilar  lymphadenopathy is identified. Mild right-sided paraseptal emphysema  is present. Small amount of fluid is present within the  posterior/dependent pericardial recess. Heart size is normal.  Scattered vascular calcifications are present. No evidence of  pericardial effusion.    Abdomen: Innumerable  hypoattenuating masses are present throughout the  liver. No evidence of biliary ductal dilatation. Portal and hepatic  veins are patent. Multiple enlarged periportal lymph nodes are present  measuring up to 1.8 cm. Mass is present within the right adrenal gland  measuring up to 3.8 cm. Left adrenal gland demonstrates mild  thickening. A 9 mm mass within the inferolateral left adrenal gland.  Kidneys demonstrate no evidence of hydronephrosis. Bilateral  nonobstructive nephrolithiasis is present. The largest is an 8 mm  stone in the right renal pelvis. Exophytic lesions are present  projecting off the bilateral kidneys which are incompletely  characterized. The left kidney demonstrates an exophytic lesion  measuring 2.3 cm projecting posteriorly. Hounsfield units are  approximately 40. Right kidney demonstrates a lesion projecting off  the superolateral right kidney measuring 1.4 cm with measuring  approximately 30-40 Hounsfield units. Smaller subcentimeter  hypoattenuating lesion is present within the cortex of the right  lateral kidney, likely benign cyst. The abdominal aorta demonstrates  severe atherosclerotic plaquing most severely affecting the infrarenal  abdominal aorta. There is approximately 60-70% narrowing. The left  common iliac artery is occluded. Severe plaquing is present throughout  the more peripheral branches with possible occlusion of the left  proximal femoral vasculature. No thickened or dilated loops of bowel  are identified. Sigmoid diverticulosis is present. Appendix is  unremarkable. No evidence of retroperitoneal lymphadenopathy.    Pelvis: Bladder is unremarkable. No free fluid in the pelvis. Small  fat-containing right-sided inguinal hernia is present. Small bilateral  testicular hydroceles are noted with small associated scrotal  calcifications.    Bone windows demonstrate lytic mass within the left lateral fourth  rib. No other destructive or aggressive osseous lesions are  identified.  Mild to moderate spinal and pelvic degenerative change is  present.      Impression    IMPRESSION:   1. Right lower lobe lung mass, multiple pulmonary nodules, and bulky  right hilar/mediastinal lymphadenopathy consistent with metastatic  disease.  2. Innumerable masses throughout the liver and enlarged periportal  lymph nodes consistent with metastatic disease.   3. Lytic mass in the left lateral fourth rib consistent with  metastatic disease.  4. Nonspecific bilateral adrenal masses (right larger than left),  suspicious for metastatic disease.  5. 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  6. Bilateral exophytic renal lesions which are incompletely  characterized, statistically likely benign/hyperdense cysts. Bilateral  nonobstructive nephrolithiasis.  7. Sigmoid diverticulosis without evidence of diverticulitis.    MARCUS CLIFTON MD   XR Pelvis and Hip Bilateral 2 Views    Narrative    PELVIS AND HIP BILATERAL TWO VIEWS   5/6/2019 9:23 AM     HISTORY: Fall 24-hours ago--cannot stand.    COMPARISON: None.    FINDINGS: None.      Impression    IMPRESSION: No fracture is identified. Sacrum is obscured by bowel  gas. Lower lumbar spine degenerative change is present. Mild bilateral  hip degenerative change is present. Soft tissues are unremarkable.    MARCUS CLIFTON MD   CBC with platelets differential   Result Value Ref Range    WBC 17.4 (H) 4.0 - 11.0 10e9/L    RBC Count 4.03 (L) 4.4 - 5.9 10e12/L    Hemoglobin 12.7 (L) 13.3 - 17.7 g/dL    Hematocrit 38.4 (L) 40.0 - 53.0 %    MCV 95 78 - 100 fl    MCH 31.5 26.5 - 33.0 pg    MCHC 33.1 31.5 - 36.5 g/dL    RDW 18.7 (H) 10.0 - 15.0 %    Platelet Count 165 150 - 450 10e9/L    Diff Method Automated Method     % Neutrophils 80.7 %    % Lymphocytes 9.0 %    % Monocytes 6.8 %    % Eosinophils 0.2 %    % Basophils 0.3 %    % Immature Granulocytes 3.0 %    Nucleated RBCs 0 0 /100    Absolute Neutrophil 14.1 (H) 1.6 -  8.3 10e9/L    Absolute Lymphocytes 1.6 0.8 - 5.3 10e9/L    Absolute Monocytes 1.2 0.0 - 1.3 10e9/L    Absolute Basophils 0.1 0.0 - 0.2 10e9/L    Abs Immature Granulocytes 0.5 (H) 0 - 0.4 10e9/L    Absolute Nucleated RBC 0.1    Comprehensive metabolic panel   Result Value Ref Range    Sodium 140 133 - 144 mmol/L    Potassium 4.8 3.4 - 5.3 mmol/L    Chloride 102 94 - 109 mmol/L    Carbon Dioxide 19 (L) 20 - 32 mmol/L    Anion Gap 19 (H) 3 - 14 mmol/L    Glucose 79 70 - 99 mg/dL    Urea Nitrogen 87 (H) 7 - 30 mg/dL    Creatinine 1.82 (H) 0.66 - 1.25 mg/dL    GFR Estimate 35 (L) >60 mL/min/[1.73_m2]    GFR Estimate If Black 41 (L) >60 mL/min/[1.73_m2]    Calcium 9.6 8.5 - 10.1 mg/dL    Bilirubin Total 6.5 (H) 0.2 - 1.3 mg/dL    Albumin 2.5 (L) 3.4 - 5.0 g/dL    Protein Total 5.6 (L) 6.8 - 8.8 g/dL    Alkaline Phosphatase 639 (H) 40 - 150 U/L     (H) 0 - 70 U/L     (H) 0 - 45 U/L   CK total   Result Value Ref Range    CK Total 242 30 - 300 U/L   UA with Microscopic   Result Value Ref Range    Color Urine Bouchra     Appearance Urine Cloudy     Glucose Urine Negative NEG^Negative mg/dL    Bilirubin Urine Negative NEG^Negative    Ketones Urine Negative NEG^Negative mg/dL    Specific Gravity Urine 1.024 1.003 - 1.035    Blood Urine Moderate (A) NEG^Negative    pH Urine 5.0 5.0 - 7.0 pH    Protein Albumin Urine Negative NEG^Negative mg/dL    Urobilinogen mg/dL 4.0 (H) 0.0 - 2.0 mg/dL    Nitrite Urine Negative NEG^Negative    Leukocyte Esterase Urine Negative NEG^Negative    Source Unspecified Urine     WBC Urine 0 0 - 5 /HPF    RBC Urine 16 (H) 0 - 2 /HPF    Squamous Epithelial /HPF Urine <1 0 - 1 /HPF    Mucous Urine Present (A) NEG^Negative /LPF    Hyaline Casts 12 (H) 0 - 2 /LPF    Uric Acid Crystals Moderate (A) NEG^Negative /HPF   Care Transition RN/SW IP Consult    Narrative    Chery Adams     5/6/2019  1:33 PM  CARE TRANSITION SOCIAL WORK INITIAL ASSESSMENT:      Met with: Patient and  Family.    DATA  Active Problems:    * No active hospital problems. *        ASSESSMENT  Cognitive Status: awake, alert and oriented.      Insurance Concerns: No Insurance issues identified     This writer met with pt and family, introduced self and role.   Discussed discharge planning and medicare guidelines in regards   to home care and SNF benefits.  Patient in ED Room 11 with new CA   diagnosis.  Patient was found down in his apt for an unknown   amount on time.  Patient states he uses a cane with ambulation at   baseline.  Patient will have a physical therapy evaluation around   1445 to determine safe disposition.  Patient wishes to return   home and is open to home care and palliative care.  Family also   requesting NH referrals be placed in the local area in case   patient is not able to return home.  NH referrals sent to:  [facility] (Admissions: [phone] Main Phone: [phone]   Fax: [phone]), [facility] (Main Phone:   [phone] Admissions Phone: [phone] Fax: [phone]),   [facility]  (Phone: [phone] Fax [phone]), and   [facility] (Admissions Phone: [phone]   Main Phone: [phone] Fax: [phone]).  Family is aware of   the private costs for NH.    PLAN    Home with Palliative Care vs NHP    LOURDES Trujillo  [facility] [phone]/ [facility] [phone]

## 2019-05-07 NOTE — ASSESSMENT & PLAN NOTE
Describes a 30 pound weight loss over the past couple of months  He seems mildly cachectic with known metastatic cancer  Await decision about his choice for the future, consider nutrition consult

## 2019-05-07 NOTE — PROGRESS NOTES
05/07/19 1600   General Information   Onset Date 05/07/19   Start of Care Date 05/06/19   Referring Physician Dr. Singh   Patient Profile Review/OT: Additional Occupational Profile Info See Profile for full history and prior level of function   Patient/Family Goals Statement Patient did not state goals at this time.   Swallowing Evaluation Bedside swallow evaluation   Behaviorial Observations Lethargic   Mode of current nutrition Oral diet   Type of oral diet Regular;Thin liquid   Respiratory Status Room air   Clinical Swallow Evaluation   Oral Musculature anomalies present   Structural Abnormalities none present   Dentition edentulous, does not have dentures   Mucosal Quality dry   Mandibular Strength and Mobility intact   Oral Labial Strength and Mobility other (see comments)  (unable to follow directions)   Lingual Strength and Mobility WFL   Velar Elevation intact   Buccal Strength and Mobility impaired   Laryngeal Function Voicing initiated;Swallow;Throat clear;Cough   Additional Documentation Yes   Swallow Eval   Feeding Assistance minimal assistance required   Clinical Swallow Eval: Thin Liquid Texture Trial   Mode of Presentation, Thin Liquids cup;self-fed   Volume of Liquid or Food Presented 1 ounce   Oral Phase of Swallow Premature pharyngeal entry   Pharyngeal Phase of Swallow impaired;coughing/choking;throat clearing   Diagnostic Statement Possible penetration or aspiration of thin liquids due to possible premature spillage over base of tongue prior to initiation of swallow and likely pharyngeal residue. Increased risk for aspiration with thin liquids.   Clinical Swallow Eval: Nectar Thick Liquid Texture Trial   Mode of Presentation, Nectar cup;self-fed   Volume of Nectar Presented 2 ounces   Oral Phase, Nectar WFL   Pharyngeal Phase, Nectar impaired;no awareness of problems;repeated swallows   Diagnostic Statement No overt s/sx of penetration/aspiration with nectar thick liquids. Evidence of mild  pharyngeal residue.   Clinical Swallow Eval: Semisolid Texture Trial   Mode of Presentation, Semisolid spoon;self-fed   Volume of Semisolid Food Presented 2 ounces   Oral Phase, Semisolid WFL   Pharyngeal Phase, Semisolid intact   Diagnostic Statement No overt s/sx of penetration/aspiration. Swallow appears functional for semisolids.   Clinical Swallow Eval: Solid Food Texture Trial   Mode of Presentation, Solid self-fed   Volume of Solid Food Presented 1 bite   Oral Phase, Solid other (see comments)  (mildly prolonged mastication)   Pharyngeal Phase, Solid intact   Diagnostic Statement Oral dysphagia secondary to missing dentition. Pt reports that he has a difficult time chewing harder foods. No overt s/sx of penetration/aspiration.   Swallow Compensations   Swallow Compensations Alternate viscosity of consistencies;Pacing;Reduce amounts   General Therapy Interventions   Planned Therapy Interventions Dysphagia Treatment   Dysphagia treatment Modified diet education;Instruction of safe swallow strategies;Compensatory strategies for swallowing   Intervention Comments Patient would benefit from skilled intervention to improve safety of oral intake and reduce risk of aspiration.   Swallow Eval: Clinical Impressions   Skilled Criteria for Therapy Intervention Skilled criteria met.  Treatment indicated.   Functional Assessment Scale (FAS) 4   Dysphagia Outcome Severity Scale (NESHA) Level 4 - NESHA   Treatment Diagnosis Oropharyngeal dysphagia   Diet texture recommendations Dysphagia diet level 3;Nectar thick liquids   Recommended Feeding/Eating Techniques maintain upright posture during/after eating for 30 mins;no straws;small sips/bites;hard swallow w/ each bite or sip   Therapy Frequency 5 times/wk   Predicted Duration of Therapy Intervention (days/wks) 1 week   Anticipated Discharge Disposition other (see comments)  (Unknow at this time)   Risks and Benefits of Treatment have been explained. Yes   Patient, family  and/or staff in agreement with Plan of Care Yes   Clinical Impression Comments Patient presents with mild-moderate dysphagia characterized by prolonged mastication, premature loss of bolus over base of tongue prior to initiation of swallow, and evidence of pharyngeal residue. These symptoms decreased with nectar thick liquids and soft solids. Recommend dysphagia level 3 diet with nectar thick liquid and pills whole in food carrier or with nectar thick liquids. Swallow strategies: No straw, sit fully upright for PO intake, small sips/bites, hard swallow. SLP to follow during length of stay as appropriate to maximize safety of oral intake and educate patient and family regarding changes in swallow function.   Total Evaluation Time   Total Evaluation Time (Minutes) 20     Thank you for this referral!    Ramona Bullock MA, CF-SLP  [facility]  [phone]

## 2019-05-07 NOTE — ASSESSMENT & PLAN NOTE
Baseline creatinine 1.4, slightly elevated 1.82  Most likely due to not drinking  IV fluids tonight, recheck tomorrow

## 2019-05-07 NOTE — PROGRESS NOTES
[facility]    Sepsis Evaluation Progress Note    Date of Service: 05/07/2019    I was called to see Bhaskar Carrillo due to abnormal vital signs triggering the Sepsis SIRS screening alert. He is not known to have an infection.     Physical Exam    Vital Signs:  Temp: 96.6  F (35.9  C) Temp src: Oral BP: 100/49 Pulse: 82   Resp: 22 SpO2: 90 % O2 Device: None (Room air)      Lab:  Lactate for Sepsis Protocol   Date Value Ref Range Status   05/07/2019 3.2 (H) 0.7 - 2.0 mmol/L Final     Comment:     Significant value called to and read back by  HAWA SANCHEZ RN AT 0528 OA.         The patient is fatigued but orientated to person and place.    The rest of their physical exam is significant for clinically being dry, no cough, no lung signs. Urine described by staff as being dark and concentrated    Assessment and Plan    The SIRS and exam findings are likely due to metastatic cancer with weight loss , there is no sign of sepsis at this time.    Disposition: The patient will remain on the current unit. We will continue to monitor this patient closely and Will order some fluids, and follow clinically.    Henri Qunin MD

## 2019-05-07 NOTE — ASSESSMENT & PLAN NOTE
Loss of balance, slipped from chair yesterday, unable to get up  Seen by physical therapy, too weak to safely go home  Placement being pursued

## 2019-05-08 NOTE — PLAN OF CARE
Discharge Planner SLP   Patient plan for discharge: Discharge on current diet, recommend a diet of puree foods and nectar thick liquids.  Current status: Patient tolerating nectar thick liquids by side of cup. Patient overtly aspirating on trials of thin liquids, evidenced by coughing, watery eyes, and wet vocal quality. He is presenting with fatigue and shortness of breath during mealtimes, resulting in decreased intake. Recommending downgrade to puree textures to decrease fatigue and increase overall safety. Patient is agreeable to this change and accepts education regarding safe swallowing strategies.  Barriers to return to prior living situation: safety of oral intake, risk of aspiration/penetration  Recommendations for discharge: Puree foods with nectar thick liquids. Pills to be taken whole in a food carrier. Sit upright during meals, take small sips and bites, and alternate textures.  Rationale for recommendations: To maximize safety of oral intake and reduce patient's risk of aspiration.       Entered by: Ruth Osborne 05/08/2019 9:06 AM

## 2019-05-08 NOTE — PROGRESS NOTES
DATE:  5/8/2019   TIME OF RECEIPT FROM LAB:  0917  LAB TEST:  lactic  LAB VALUE:  2.6  RESULTS GIVEN WITH READ-BACK TO (PROVIDER):  Singh  TIME LAB VALUE REPORTED TO PROVIDER:   0987

## 2019-05-08 NOTE — PLAN OF CARE
Discharge Planner OT   Patient plan for discharge: Home  Current status: Pt sleeping with lights off upon arrival. Pt reported significant fatigue and required encouragement to participate in OT session this date. Pt completed [facility] cognitive screen, scoring 11/30. Pt and son educated on results of cognitive screen and recommendations for safe discharge disposition. Pt continues to demonstrate significant decrease in activity tolerance, impacting performance and safety with ADL tasks.   Barriers to return to prior living situation: Level of assist; cognition; decreased strength and activity tolerance  Recommendations for discharge: TCU  Rationale for recommendations: Pt would benefit from further skilled OT services within the TCU setting to increase independence and safety with ADL and for safe discharge disposition given pt's medical diagnosis and medical needs       Entered by: Ramona Logan 05/08/2019 12:24 PM

## 2019-05-08 NOTE — CONSULTS
CLINICAL NUTRITION SERVICES  -  ASSESSMENT NOTE      RECOMMENDATIONS FOR MD/PROVIDER TO ORDER:   None   Recommendations Ordered by Registered Dietitian (RD):   Start Magic Cup every day  Start Boost Breeze (made nectar thick) BID  Start Snack every day (smooth yogurt or pudding)   Future/Additional Recommendations:   Continue to monitor and encourage PO intake    Malnutrition: Severe malnutrition  In Context of:  Chronic illness or disease     REASON FOR ASSESSMENT  Bhaskar Carrillo is a 78 year old male seen by Registered Dietitian for metastatic cancer, 30 lb weight loss in past few months, decreased oral intake, new dysphagia discovered      NUTRITION HISTORY  Information obtained from EMR:  -Pt presented to ED for fall, admitted for acute kidney injury superimposed on chronic kidney disease  -Hx HTN, CKD, hyperlipidemia, smoking and COPD  -Liver mass found on ultrasound  -Pt was recently admitted on 5/3 with wt loss, weakness, and elevated liver enzymes    Information obtained from pt:  -Pt reports that his appetite has been poor for a long time. He ate some of his pancakes this morning, but they had too much syrup on them. He usually has eggs for breakfast at home. Usual dietary intake is sporadic for pt. He does not consume regular, balanced meals. Instead, he eats 'when he's hungry.'   -Pt drinks water throughout the day, about 10oz w/every meals plus some between. He dislikes the nectar thick pre-thickened water d/t lemon flavor, but will drink it mixed w/thickening packet (drank almost a full 8oz glass during visit).   -Dislikes Boost- tried it in the past. He is willing to try Magic Cup and Boost Breeze    CURRENT NUTRITION ORDERS  Diet Order:     Dysphagia Pureed with Nectar Thick Liquids (started today), no straws    Current Intake/Tolerance:  -Pt 'refused supper on 5/07, tolerated nectar thick liquids, needing reminders to sit up and drink' per RN note today  -Pt took a few bites of a pancake today  -100%  "B and 25% L on 5/07      NUTRITION FOCUSED PHYSICAL ASSESSMENT (NFPA)  Completed:  Yes Full assessment         Observed:    Dry skin    Obtained from Chart/Interdisciplinary Team:  None noted    ANTHROPOMETRICS  Height: 5' 6\"  Weight: 158 lbs 11.7 oz  Body mass index is 25.62 kg/m .  Weight Status:  Overweight BMI 25-29.9  IBW: 142 lb/65 kg  % IBW: 11%  Weight History:   Wt Readings from Last 3 Encounters:   05/06/19 72 kg (158 lb 11.7 oz)   05/03/19 72.9 kg (160 lb 11.2 oz)   07/10/18 85.7 kg (189 lb)   -No hx of wts within past 6 months available, besides recent admission on 5/03.   -Pt has lost 31 lb/16.4% BW over the past 10 months.    LABS  Na 147 (H)  Chl 118 (H)  BUN 81 (H)  Creat 1.48 (h)  GFR 45 (L)    MEDICATIONS  Medications reviewed      ASSESSED NUTRITION NEEDS PER APPROVED PRACTICE GUIDELINES:    Dosing Weight 72 kg (Actual BW)  Estimated Energy Needs: 4563-5839 kcals (25-30 Kcal/Kg)  Justification: maintenance  Estimated Protein Needs:  grams protein (1.2-1.5 g pro/Kg)  Justification: maintenance, Repletion and hypercatabolism with critical illness  Estimated Fluid Needs: 1 mL/Kcal  Justification: maintenance    MALNUTRITION:  % Weight Loss:  > 10% in 6 months (severe malnutrition)  % Intake:  </= 75% for >/= 1 month (severe malnutrition)  Subcutaneous Fat Loss:  None observed  Muscle Loss:  None observed  Fluid Retention:  None noted    Malnutrition Diagnosis: Severe malnutrition  In Context of:  Chronic illness or disease    NUTRITION DIAGNOSIS:  Inadequate protein-energy intake related to poor appetite/dysphagia as evidenced by usual dietary intake meeting <75% estimated needs, need for pureed texture diet with nectar thick liquids, and wt loss of 31 lb/16.4% BW over the past 10 months.      NUTRITION INTERVENTIONS  Recommendations / Nutrition Prescription  Start Magic Cup every day  Start Boost Breeze BID  Start Snack every day (smooth yogurt or pudding)    Implementation  Nutrition " education: Provided education on ways to increase kcal and protein in the diet- discussed pureed options, provided handout High Calorie, High Protein Recipes (highlighted pureed appropriate ones and indicated to thicken up the smoothie recipes)  Composition of Meals and Snacks, General/healthful diet and Medical Food Supplement    Nutrition Goals  PO intake of >50% meals, snacks, and supplements  Maintain wt at 72 kg  Continue to monitor and encourage PO intake       MONITORING AND EVALUATION:  Progress towards goals will be monitored and evaluated per protocol and Practice Guidelines, Diet Order, Food intake, Fluid/beverage intake, Liquid meal replacement or supplement and Weight        Kathleen Plaza RD, LD  Clinical Dietitian  [facility]: [phone]  [facility]: [phone]

## 2019-05-08 NOTE — PROGRESS NOTES
Pt was trialed on HHFNC at 30 lpm and 21% FiO2 for CPAP/PEEP support.  Pt tolerated about 5 minutes of this and refused to go back on.  RN and MD notified.    RT Jaiden on 5/8/2019 at 12:57 PM

## 2019-05-08 NOTE — PLAN OF CARE
S-(situation): shift note    B-(background): weakness, fall    A-(assessment): see VS f/s. Refused supper, after speech therapy. Tolerated nectar thick liquid, needing reminders to sit up to drink. Frequent urination with incontinence, weighing incontinent pads. Scanned for post residual void of 199. Up to edge of bed to void without results x1, assist of one and walker.    R-(recommendations): will continue to monitor voiding, labs.

## 2019-05-08 NOTE — PROGRESS NOTES
[facility] has an opening for Friday.  They request updated notes and nurse assessment be done on Thursday.  Goal is for patient to start with TCU for strengthening.  Family updated.    LOURDES Trujillo  [facility] [phone]/ [facility] [phone]

## 2019-05-08 NOTE — PROGRESS NOTES
[facility]    Medicine Progress Note - Hospitalist Service       Date of Admission:  5/6/2019  Assessment & Plan     Principal Problem:    Acute kidney injury superimposed on chronic kidney disease (H)    Assessment:  Present initially and thought secondary to patient dehydration and ongoing poor oral intake with minimal improvement following fluid resuscitation on patient's observation stay and patient was transitioned to inpatient status on 5/7/2019 for ongoing fluid resuscitation.  Creatinine this morning is down to 1.48, however patient is now having increased shortness of breath with chest x-ray showing concern for possible increased vascular congestion.  Although he does not like a dysphasia diet patient's oral intake has improved recommended by speech therapy    Plan: We will saline lock IV fluids, continue with dysphagia diet as patient continues to make ongoing choices regarding his goals of care going forward, proceed with low-dose Lasix secondary to shortness of breath as outlined below and recheck renal function tomorrow.  At this point it is anticipated that patient's recent renal baseline is likely higher than previously indicated    Active Problems:    Shortness of breath    Assessment:  New as of this morning, thought likely secondary to patient's large fluid bolusing and volume resuscitation that has occurred in the past 24 hours.  Mild crackles present in bilateral bases.  Chest x-ray reviewed per my view and no pneumonia but concerning for possible increased vascular congestion - radiology report is pending. Patient did notice some improvement after the albuterol neb    Plan:  Will give Lasix 10 mg X1 now and monitor for results.  Continue with nebs every 2 hours prn and consider repeat Lasix dosing in several hours depending on patient's symptoms and urinary output.  Will monitor for official radiologist report read.  Did discuss with patient performing an  echocardiogram as he does not have a known history of congestive heart failure, however at this time he is not interested as he would not want to take medications for it and given his recent cancer diagnosis does not feel that this is a priority as long as he can make his breathing feel better.      Elevated lactic acid    Assessment: Lactic acid was 3.2 on initial evaluation and it improved down to 2.3 with fluid resuscitation, however now has  respiratory worsening as above.  Recent lactic acid is 2.6.  Patient is not acidotic, appears over hydrated, has not received any medications that would cause an elevated lactic acid.  His procalcitonin level from yesterday was significantly elevated at 12.79 however patient has been afebrile, chest x-ray negative, urine negative.  In reviewing what can cause procalcitonin level to be elevated, there are certain cancers that can cause this elevation even if an infection is not present.    Plan: We will discontinue routine monitoring of lactic acid as the etiology for ongoing elevation is unclear in the context of a new undiagnosed cancer.  We will recheck a procalcitonin again today to ensure overall stability compared to the level drawn yesterday, continue to monitor closely for any fever or other signs of infection      Decreased oral intake    Assessment: Patient has been placed on a dysphagia diet with thickened liquids and although he did like the consistency, he has had significant improvement in his oral intake and has been swallowing safely without further coughing noted.      Plan: We will saline lock IV fluids, continue to encourage adequate oral intake with thickened liquids.  Did discuss with patient that if his goals of care change going forward and he desires comfort care, could consider resuming a regular diet however reviewed in great detail the potential risk of aspiration with that choice.  At this time patient is willing to continue with a dysphagia  diet.      Urinary retention    Assessment: Patient reports that he does have frequent small voids in the home setting the patient did need straight cath x1 on his initial observation night.  He has been voiding small but frequent amounts since that time.  It is not known if he has BPH at baseline, as patient does not normally go to the doctor, however is suspected    Plan: Continue to monitor urinary output and perform post void bladder scan residual if there is concern for recurrent urinary retention.  Patient is not interested in starting any medication for this retention currently      Metastatic cancer (H)    Assessment: New diagnosis, unknown primary, with metastatic lesions being found in the liver, adrenal glands, bone, lungs and lymph nodes    Plan: Upon further reflection of the conversation that occurred yesterday, patient does not want to pursue a liver biopsy or any identification of the cancer.  He would not want any treatment.  His goal is to try and achieve enough strength in order to return home to put his affairs in order and then most likely go to a long-term care facility with hospice involvement as the end comes more near.  Social work is consulted and is attempting to find TCU placement.      Fall at home, initial encounter    Assessment: With patient's unable to get up independently and was on the ground for approximately 18 hours before the neighbor discovered him.  No known fractures, no evidence of rhabdomyolysis     Plan: We will plan for TCU at time of discharge      Generalized muscle weakness    Assessment: Likely contributing to fall as above and is likely multifactorial in etiology including metastatic cancer diagnosis, acute kidney injury    Plan: ongoing physical therapy and occupational therapy during this hospitalization, TCU at time of discharge.       Protein-calorie malnutrition (H)    Assessment: Likely secondary to poor oral intake with metastatic cancer, patient has had an  approximately 30 pound weight loss in the past few months    Plan: Dietitian to see today,  continue dysphasia diet      Tobacco use disorder    Assessment:  Patient declines need for nicotine patch    Plan: We will continue to monitor and initiate if desired      Advanced directives, counseling/discussion    Assessment: DNR/DNI    Plan: Will honor      Hypertension goal BP (blood pressure) < 140/90    Assessment: Present at baseline with patient normally on hydrochlorothiazide and lisinopril however blood pressures have been low normal in the  range during this hospitalization despite home medication being held.  Patient has had a 30 pound weight loss in the past few months, likely secondary to cancer    Plan: We will continue to hold home regimen and monitor for blood pressure stability      CKD (chronic kidney disease) stage 3, GFR 30-59 ml/min (H)    Assessment: with acute worsening as above    Plan: proceed with plan as above       Diet: Dysphagia Diet Level 3 Advanced Nectar Thickened Liquids (pre-thickened or use instant food thickener)    DVT Prophylaxis: Pneumatic Compression Devices  Hansen Catheter: not present  Code Status: DNR/DNI      Disposition Plan   Expected discharge: 2 - 3 days, recommended to transitional care unit once Respiratory status stabilizes, tolerating oral intake well and able to maintain hydration, TCU placement has been found.  Entered: Omaira Singh MD 05/08/2019, 9:11 AM       The patient's care was discussed with the Bedside Nurse, Patient and Patient's Family.    Omaira Singh MD  Hospitalist Service  [facility]    ______________________________________________________________________    Interval History   Patient has developed worsening shortness of breath in the past few hours but no hypoxemia the patient is mildly tachypneic and unable to sleep secondary to his breathing issues.  Blood pressures continue to be low  normal and other vitals remained stable.  Tolerating dysphasia diet without difficulty, although he does not like the thickened liquids.  No other new concerns.    Data reviewed today: I reviewed all medications, new labs and imaging results over the last 24 hours. I personally reviewed chest x-ray Which shows increased vascular congestion but no evidence of pneumonia.  Official radiologist report is pending.    Physical Exam   Vital Signs: Temp: 96.7  F (35.9  C) Temp src: Oral BP: 94/44 Pulse: 85   Resp: 20 SpO2: 96 % O2 Device: None (Room air)    Weight: 158 lbs 11.7 oz  Constitutional: Awake, alert, cooperative, more short of breath and appears slightly uncomfortable and labored in his breathing on exam  Respiratory: Increased respiratory rate and labored breathing is noted, patient does have mild crackles diffusely, no wheezes auscultated  Cardiovascular: Regular rate and rhythm  GI: Bowel sounds present, abdomen is obese but soft with ongoing firmness in the right upper quadrant with mild pain on palpation  Musculoskeletal: Patient does have trace to 1+ nonpitting edema in bilateral lower extremities which is new compared to yesterday  Neurologic: Awake, alert, oriented to name, place and time and somewhat to situation    Data   Recent Labs   Lab 05/08/19  0549 05/07/19  2155 05/07/19  1311 05/07/19  0520 05/06/19  0754  05/03/19  1311   WBC 11.3*  --   --  14.5* 17.4*   < >  --    HGB 8.6* 8.5* 9.0* 9.4* 12.7*   < >  --    MCV 98  --   --  96 95   < >  --    *  --   --  135* 165   < >  --    *  --   --  139 140  --  135   POTASSIUM 4.2  --   --  4.6 4.8  --  4.6   CHLORIDE 118*  --   --  108 102  --  101   CO2 17*  --   --  19* 19*  --  22   BUN 81*  --   --  100* 87*  --  65*   CR 1.48*  --  1.61* 1.84* 1.82*  --  1.59*   ANIONGAP 12  --   --  12 19*  --  12   RIZWANA 7.7*  --   --  8.2* 9.6  --  8.7   GLC 73  --   --  83 79  --  100*   ALBUMIN  --   --   --   --  2.5*  --  2.4*   PROTTOTAL  --   --    --   --  5.6*  --  5.8*   BILITOTAL  --   --   --   --  6.5*  --  5.3*   ALKPHOS  --   --   --   --  639*  --  594*   ALT  --   --   --   --  243*  --  128*   AST  --   --   --   --  319*  --  155*   LIPASE  --   --   --   --   --   --  748*   TROPI  --   --   --   --   --   --  <0.015    < > = values in this interval not displayed.

## 2019-05-08 NOTE — PLAN OF CARE
Discharge Planner PT   Patient plan for discharge: Unknown  Current status: Cancer mets wide spread, fell and was down x 18 hours. Placement for safety. He was had resting oxygen sat on room air at 92%. Upon exercise and sitting on edge of bed, this improved to 93-94%. He is short of air and had thicken liquid from cup when sitting up. He is impulsive with his movements and will initiate movement eg supine<->sit before alarm, obstacles and assistance set up. He reports he is tired and did just complete OT upon PT arrival. His son Gadiel was present during introduction today and he had no questions at this time. He did not report symptoms before or after the session. He used the trapeze and bed rail to transfer. It took much effort for him to scoot to head of bed using trapeze. The bed alarm was reactivated after the session, call light within reach. He requested the trapeze stay down as he liked having his hands on it. He declined being covered with sheet/spread as he was too hot so coverings left to his R within reach as needed. The pressure garments were left off as they were not applied before the session. He declined standing today as he was too tired.   Barriers to return to prior living situation: Endurance, assistance, insight, transfers, bed mobility  Recommendations for discharge: TCU  Rationale for recommendations: For safety and due to fatigue/tired and decreased insight. Progress bed mobility, transfers, gait, endurance. He has poor insight and requires assist devices for mobility. Benefit from placement for safe disposition, anticipate progression to long term cares given medical diagnosis and prognosis.            Entered by: Martha Cazares 05/08/2019 11:47 AM

## 2019-05-08 NOTE — PLAN OF CARE
Pt received 1L IV fluid bolus over two hours, BP now 102/48. Next lactic acid lab this morning. vss. Lungs are diminished. Pt has been incontinent of urine, incontinent briefs being weighed, see I and O. Denies pain. Abdomen is distended, slightly firm, active bowel sounds x4. Pt tolerating nectar thick water overnight, asking for water often but drinks in small amounts, see I and O. Alert, gets the year mixed up, thinking it is 1919, otherwise is oriented, uses call light appropriately and is cooperative. Will continue to monitor labs/lactic closely, I and O, monitor for pain, PT/OT.

## 2019-05-08 NOTE — PROGRESS NOTES
Repeat Lactic acid at 2.5. Continues on IV fluids, Hgb trending down, possibly from dilution, no signs of bleeding. Procalcitonin was elevated, no obvious source of infection, BC times 2 drawn. No fevers, VSS. Procalcitonin possibly elevated due to metastatic cancer. Pt and family to decide if further work-up or biopsy to be done.  Tonite will reorder another bolus, increase IV fluids. Recheck labs including Lactate in AM . Hold abx for now  Electronically signed by MELLY Quinn on May 7, 2019

## 2019-05-09 PROBLEM — I48.91 RAPID ATRIAL FIBRILLATION (H): Status: ACTIVE | Noted: 2019-01-01

## 2019-05-09 PROBLEM — I95.9 HYPOTENSION: Status: ACTIVE | Noted: 2019-01-01

## 2019-05-09 NOTE — ANESTHESIA PREPROCEDURE EVALUATION
Anesthesia Pre-Procedure Evaluation    Patient: Bhaskar Carrillo   MRN: [id number] : 1940          Preoperative Diagnosis: * No pre-op diagnosis entered *    * No procedures listed *    Past Medical History:   Diagnosis Date     Hypertension      History reviewed. No pertinent surgical history.    Anesthesia Evaluation     .             ROS/MED HX    ENT/Pulmonary:     (+)tobacco use, COPD, , . .    Neurologic:       Cardiovascular:     (+) hypertension-range: < 140 / 90, ---. : . . . :. .       METS/Exercise Tolerance:     Hematologic:         Musculoskeletal:         GI/Hepatic:         Renal/Genitourinary: Comment: Stage III Renal Failure    (+) chronic renal disease, type: ARF and CRI,       Endo:         Psychiatric:         Infectious Disease:   (+) Other Infectious Disease Sepsis - hypotension      Malignancy:   (+) Malignancy           Other:                          Physical Exam      Airway   Mallampati: II  TM distance: >3 FB  Neck ROM: full    Dental     Cardiovascular       Pulmonary             Lab Results   Component Value Date    WBC 11.7 (H) 2019    HGB 8.4 (L) 2019    HCT 26.7 (L) 2019     (L) 2019     (H) 2019    POTASSIUM 3.9 2019    CHLORIDE 116 (H) 2019    CO2 17 (L) 2019    BUN 73 (H) 2019    CR 1.50 (H) 2019    GLC 97 2019    RIZWANA 8.1 (L) 2019    ALBUMIN 2.5 (L) 2019    PROTTOTAL 5.6 (L) 2019     (H) 2019     (H) 2019    ALKPHOS 639 (H) 2019    BILITOTAL 6.5 (H) 2019    LIPASE 748 (H) 2019       Preop Vitals  BP Readings from Last 3 Encounters:   19 (!) 75/41   19 107/56   18 136/70    Pulse Readings from Last 3 Encounters:   19 142   19 87   18 60      Resp Readings from Last 3 Encounters:   19 24   19 28   07/10/18 16    SpO2 Readings from Last 3 Encounters:   19 94%   19 97%   07/10/18  "97%      Temp Readings from Last 1 Encounters:   05/09/19 95.6  F (35.3  C) (Oral)    Ht Readings from Last 1 Encounters:   05/06/19 1.676 m (5' 6\")      Wt Readings from Last 1 Encounters:   05/06/19 72 kg (158 lb 11.7 oz)    Estimated body mass index is 25.62 kg/m  as calculated from the following:    Height as of this encounter: 1.676 m (5' 6\").    Weight as of this encounter: 72 kg (158 lb 11.7 oz).       Anesthesia Plan  Procedure only, no anesthetic delivered    History & Physical Review      ASA Status:  3 emergent.        Plan for     Triple lumen central line placement      Postoperative Care      Consents                 BEVERLY Brown CRNA  "

## 2019-05-09 NOTE — PLAN OF CARE
S-(situation):Patient has been moved to ICU and medical status is declining    B-(background): Clinical bedside swallow evaluation completed yesterday and patient placed on nectar thick liquids and pureed foods.      A-(assessment): Patient not appropriate for dysphagia therapy today due to decline in medical status.      R-(recommendations): Withhold dysphagia therapy at this time until patient appropriate and agreeable to therapy.

## 2019-05-09 NOTE — PROGRESS NOTES
[facility]    Sepsis Evaluation Progress Note    Date of Service: 05/09/2019    I was called to see Bhaskar Carrillo due to abnormal vital signs triggering the Sepsis SIRS screening alert. He is not known to have an infection.     Physical Exam    Vital Signs:  Temp: 95.3  F (35.2  C) Temp src: Oral BP: (!) 69/41 Pulse: 152   Resp: 22 SpO2: 94 % O2 Device: None (Room air) Oxygen Delivery: Other (Comments)(40 lpm)    Lab:  Lactic Acid   Date Value Ref Range Status   05/08/2019 2.6 (H) 0.7 - 2.0 mmol/L Final     Comment:     Significant value called to and read back by  MONIQUE CARMEN RN IN MED SURG AT 0915 ON 5/8/19 DC       Lactate for Sepsis Protocol   Date Value Ref Range Status   05/09/2019 3.7 (H) 0.7 - 2.0 mmol/L Final     Comment:     Significant value called to and read back by  DWAINE BOWLES RN AT 0559 OA.         The patient is at baseline mental status.    The rest of their physical exam is significant for very weak radial pulse but normal capillary refill, mild tachypnea with otherwise normal respiratory effort, and tachycardia with irregularly irregular rhythm.    Assessment and Plan    The SIRS and exam findings are likely due to Rapid atrial fibrillation, there is no sign of sepsis at this time.    Disposition: The patient will be transferred to the ICU. Attending Physician, Dr. Eduardo, was notified.    Hao Eduardo MD

## 2019-05-09 NOTE — PLAN OF CARE
Patient was up every 30 minutes through the night to sit at edge of bed with a strong desire to need to drink water (thickened.)  Patient oriented to person and place. Patient oriented to Patient denied any pain and also reported unable to sleep.  Melatonin was given but not effective.  Lungs sounds coarse with Expiratory wheezes/SOB with exertion of any kind. At 430, pulse was in 150's, respirations 24 and blood pressure's started dropping.  MD notified and he came and assessed. Bolus of 500 was ordered, 12 lead EKG as well as placed on telemetry.  Will continue to monitor care closely.

## 2019-05-09 NOTE — CONSULTS
"Called to pt's bed side for central line placement.  After chart review and at time of consent of line placement pt refuses to under go placement of line \"because I am too tired.  CRNA explained benefits of line placement as well as risks and pt still refuses.  ICU RN at bedside at time of discussion and she will notify Dr Eduardo and CRNA will come back if pt decides to under go central line placement.  "

## 2019-05-09 NOTE — PROGRESS NOTES
S: respiratory therapy  B: metastatic CA  A: BS currently are rhonchi throughout, now requiring 6 lpm of oxygen via nasal canula.  Was on room air all day today.  RR has been 32 bpm.  Pt was fluid over loaded 5/8/2019, which he refused HHFNC, but did diuresed well and improved throughout the day on 5/8/2019.  Pt received around 4-5L of fluid today.  MD's notified about respiratory distress and discomfort throughout the day.  Albuterol neb was given per MD request with no improvement.  R: continue to see patient until discharge, would recommend CPAP/PS for patient to relieve his air hunger.    Reggie Feliz, RT on 5/9/2019 at 6:37 PM

## 2019-05-09 NOTE — PROGRESS NOTES
[facility]    Sepsis Evaluation Progress Note    Date of Service: 05/09/2019    I was called to see Bhaskar MARY Carrillo due to abnormal vital signs triggering the Sepsis SIRS screening alert. He is not known to have an infection. EKG appears to be sinus tach, although could be atrial fib.     Physical Exam    Vital Signs:  Temp: 95.5  F (35.3  C) Temp src: Oral BP: (!) 83/49 Pulse: 153   Resp: 22 SpO2: 95 % O2 Device: None (Room air) Oxygen Delivery: Other (Comments)(40 lpm)    Lab:  Lactic Acid   Date Value Ref Range Status   05/08/2019 2.6 (H) 0.7 - 2.0 mmol/L Final     Comment:     Significant value called to and read back by  MONIQUE CARMEN RN IN MED SURG AT 0915 ON 5/8/19 DC       Lactate for Sepsis Protocol   Date Value Ref Range Status   05/09/2019 3.7 (H) 0.7 - 2.0 mmol/L Final     Comment:     Significant value called to and read back by  DWAINE BOWLES RN AT 0559 OA.         The patient is at baseline mental status.    The rest of their physical exam is significant for clinically looks dry, requesting increased fluids. LUngs with crackles at right base, but that is area of cancer. .Procalcitonin quite high but no obvious infection, likely due to cancer    Assessment and Plan    The SIRS and exam findings are likely due to mild dehydration, there is no sign of sepsis at this time.    Disposition: The patient will remain on the current unit. We will continue to monitor this patient closely and has an underlying condition of metastatic cancer that will continue to affect their vital signs and should not have further labs drawn to assess sepsis unless their clinical appearance changes.    Henri Quinn MD

## 2019-05-09 NOTE — PLAN OF CARE
S- Transfer to ICU from [facility] - settled patient and reported off to Nathaly RN at 1020am    B- hypotension    A- Brief systems assessment: Remains hypotensive, afib with -160s.    R- Transfer to ICU per physician orders. Continue to monitor pt and update physician as needed.       Code status: DNR / DNI  Skin: dry skin - no full assessment done - reported to Nathaly FRANCISCO  Fall Risk: Yes- Department fall risk interventions implemented.  Isolation: None  Core Measures: none  Medication drips upon transfer: none

## 2019-05-09 NOTE — PLAN OF CARE
S-(situation): End of shift note.    B-(background): Acute kidney injury    A-(assessment): BP 90s/40s.  Labored breathing most of this shift.  IVFs discontinued.  Fine crackles heard initially.  NEBs given with some relief.  Lasix 10mg IVP given x2.  Patient incontinent of urine.  Weighing briefs.  On RA, SATs in mid 90s.  Patient refused hiflow O2.  Intermittently confused to place and time.  Failed swallow study.  Now on dys I, pureed with nectar thick liquids.  No straws.  Pills whole in applesauce.  Denies pain.      R-(recommendations): Continue to monitor.

## 2019-05-09 NOTE — PROGRESS NOTES
S-(situation): Family notification    B-(background): Transfer from medical unit to ICU    A-(assessment): Patient's son, Gadiel, called and updated on medical status and transfer into ICU.      R-(recommendations): Will continue to monitor patient.

## 2019-05-09 NOTE — PROGRESS NOTES
Patient now in ICU.  TCU referrals still pending.  Writer has spoken with admissions at [facility].  They would like an update on this patient on Monday, 5/13/19 or when patient no longer needs ICU.      Care Transitions will continue to follow this patient and assess discharge planning needs.

## 2019-05-09 NOTE — PROGRESS NOTES
[facility]    Medicine Progress Note - Hospitalist Service       Date of Admission:  5/6/2019  Assessment & Plan    78-year-old man admitted with weakness and recent falls found to have test results suspicious for widely metastatic cancer, a new diagnosis for him.  He also presented with acute kidney injury.  He had been improving but abruptly deteriorated overnight with onset of acutely life-threatening rapid atrial fibrillation with hypotension.  Paroxysmal atrial fibrillation with rapid ventricular response precipitating hypotension and elevated lactic acid level consistent with tissue hypoxia is suspected.  Options for additional evaluation treatment have been limited by his care preferences to avoid aggressive interventions including electrical cardioversion and central venous catheter placement.  He is at high risk for death from rapid atrial fibrillation with hypotension.    Principal Problem:    Metastatic cancer (H)  Active Problems:    Rapid atrial fibrillation (H)    Hypotension    Hypertension goal BP (blood pressure) < 140/90    CKD (chronic kidney disease) stage 3, GFR 30-59 ml/min (H)    Protein-calorie malnutrition (H)    Acute kidney injury superimposed on chronic kidney disease (H)    Decreased oral intake    Urinary retention    Tobacco use disorder    Advanced directives, counseling/discussion    Fall at home, initial encounter    Generalized muscle weakness    Transferred to the ICU this morning for evaluation and treatment of rapid atrial fibrillation and hypotension  Central venous catheter placement recommended and Hansen catheter considered but declined by the patient due to his current and previously expressed care preferences  Electrical cardioversion considered but the patient had previously expressed preference to avoid resuscitation  Patient treated aggressively with IV fluid resuscitation and continued to be hypotensive, so amiodarone bolus was ordered  along with 1 dose of IV digoxin for treatment of rapid atrial fibrillation  Blood cultures and procalcitonin repeated, avoiding antibiotic therapy at this time as there is no overt source for infection and his current clinical status is probably due to rapid atrial fibrillation rather than sepsis but would reconsider adding antibiotics if he continues to deteriorate and/or hypotension fails to respond to effective treatment of atrial fibrillation    Diet: Dysphagia Diet Level 1 Pureed Nectar Thickened Liquids (pre-thickened or use instant food thickener)  Snacks/Supplements Adult: Boost Breeze; Between Meals  Snacks/Supplements Adult: Magic Cup; Between Meals  Snacks/Supplements Adult: Other; Smooth yogurt or pudding; Between Meals    DVT Prophylaxis: Pneumatic Compression Devices  Hansen Catheter: not present  Code Status: DNR/DNI      Disposition Plan   Expected discharge: 2 - 3 days if he survives this acute illness, recommended to transitional care unit once Medically stable.  Entered: Hao Eduardo MD 05/09/2019, 3:14 PM       The patient's care was discussed with the Patient and Patient's Family.    Total critical care time today 65 minutes including the time spent transferring the patient to the ICU, treating hypotension with aggressive IV fluid therapy, and treating life-threatening acute rapid atrial fibrillation with IV amiodarone  Hao Eduardo MD  Hospitalist Service  [facility]    ______________________________________________________________________    Interval History   Early this morning, the patient abruptly became tachycardic with irregular rhythm and simultaneously became hypotensive.  Aside from fatigue he did not offer new complaints.  He denied dyspnea, chest pain, or dizziness.  He was found to have elevated lactic acid level.  He was treated with repeated boluses of IV fluids for a total of at least 30 mL's per kilogram IV fluid boluses.  Due to persistent  hypotension, he was transferred to the ICU.  He declined central venous catheter and PICC line placement.  Through peripheral IV, he was treated with a bolus of IV amiodarone after which he cardioverted.  He says that he is tired and just wants to rest.    Data reviewed today: I reviewed all medications, new labs and imaging results over the last 24 hours. I personally reviewed telemetry demonstrating atrial fibrillation with rapid ventricular response.    Physical Exam   Vital Signs: Temp: 97.8  F (36.6  C) Temp src: Oral BP: (!) 83/53 Pulse: 71 Heart Rate: 80 Resp: 20 SpO2: 93 % O2 Device: None (Room air)    Weight: 158 lbs 11.7 oz  General Appearance: Ill-appearing lying in bed although no signs of acute distress  Respiratory: Normal respiratory effort, grossly audible wheezing although lung fields are clear peripherally  Cardiovascular: Tachycardic with irregularly irregular rhythm with heart rate 140-150, very weak radial pulse, capillary refill less than 2 seconds in the fingertips  GI: Soft abdomen without tenderness  Skin: Pale-colored, no rash  Other: Alert and maintains wakefulness and attention    Data   Recent Labs   Lab 05/09/19  0933 05/09/19  0540 05/08/19  0549 05/07/19  2155 05/07/19  1311 05/07/19  0520 05/06/19  0754  05/03/19  1311   WBC  --  11.7* 11.3*  --   --  14.5* 17.4*   < >  --    HGB  --  8.4* 8.6* 8.5* 9.0* 9.4* 12.7*   < >  --    MCV  --   --  98  --   --  96 95   < >  --    PLT  --   --  119*  --   --  135* 165   < >  --    NA  --  145* 147*  --   --  139 140  --  135   POTASSIUM  --  3.9 4.2  --   --  4.6 4.8  --  4.6   CHLORIDE  --  116* 118*  --   --  108 102  --  101   CO2  --  17* 17*  --   --  19* 19*  --  22   BUN  --  73* 81*  --   --  100* 87*  --  65*   CR  --  1.50* 1.48*  --  1.61* 1.84* 1.82*  --  1.59*   ANIONGAP  --  12 12  --   --  12 19*  --  12   RIZWANA  --  8.1* 7.7*  --   --  8.2* 9.6  --  8.7   GLC 97 97 73  --   --  83 79  --  100*   ALBUMIN  --   --   --   --   --    --  2.5*  --  2.4*   PROTTOTAL  --   --   --   --   --   --  5.6*  --  5.8*   BILITOTAL  --   --   --   --   --   --  6.5*  --  5.3*   ALKPHOS  --   --   --   --   --   --  639*  --  594*   ALT  --   --   --   --   --   --  243*  --  128*   AST  --   --   --   --   --   --  319*  --  155*   LIPASE  --   --   --   --   --   --   --   --  748*   TROPI  --   --   --   --   --   --   --   --  <0.015    < > = values in this interval not displayed.     Blood cultures obtained May 7 remain negative to date  Lactic acid level 3.7 earlier this morning and then 3.4 this morning after IV fluid boluses    Arterial blood gas pH 7.38, PCO2 28, PO2 72, bicarbonate 16 in room air      Medications     amiodarone 1 mg/min (05/09/19 1108)    Followed by     amiodarone       IV infusion builder WITH additives 100 mL/hr at 05/09/19 1100       heparin lock flush  5-10 mL Intracatheter Q24H     lactated ringers  500 mL Intravenous Once

## 2019-05-09 NOTE — PLAN OF CARE
S-(situation): End of shift note.    B-(background): Rapid A.Fib w/RVR and hypotension    A-(assessment): Patient moved into ICU for rapid HR and hypotension.  Vitals as charted.  See flowsheets.  Patient very restless and has labored breathing, and states he is very tired and just wants to sleep.  O2 SATs in low 90s, occasionally dropping to mid 80s.  Patient refusing to have O2 on.  Pressures continue to be soft.  Tele converted to SR at approximately 1130 this morning.  Denies pain.  Poor appetite.  Family notified of change in medical status.  See progress notes.    R-(recommendations): Continue to monitor.

## 2019-05-09 NOTE — SIGNIFICANT EVENT
Significant Event Note    Time of event: 4:09 PM May 9, 2019    Description of event:  Repeat lactic acid 4.2  Fluctuating BP. Cardioverted to NSR from a fib earlier today. No known infection, no on antibiotics so far and procalcitonin elevated but unchanged in last 24 hours. Knonw adrenal masses, consider adrenal insufficiency.  Anemic but hgb stable past 1-2 days.    Plan:  IVF bolus 1L LR now  Check cortisol and consider IV hydrocortisone if low  Consider antibiotics if hypotension continues to recur despite maintaining NSR  Follow hgb    Discussed with: bedside nurse and on-call team, Dr. Kai Eduardo MD

## 2019-05-09 NOTE — PROGRESS NOTES
DATE:  5/9/2019   TIME OF RECEIPT FROM LAB:  404pm  LAB TEST:  lactic  LAB VALUE:  4.2  RESULTS GIVEN WITH READ-BACK TO (PROVIDER):  Jayce  TIME LAB VALUE REPORTED TO PROVIDER:   404pm

## 2019-05-09 NOTE — PROGRESS NOTES
DATE:  5/9/2019   TIME OF RECEIPT FROM LAB:  0601  LAB TEST:  Lactate for sepsis  LAB VALUE:  3.7  RESULTS GIVEN WITH READ-BACK TO (PROVIDER): MD at desk when message was received and spoke with MD about lab  TIME LAB VALUE REPORTED TO PROVIDER:   0601

## 2019-05-09 NOTE — PLAN OF CARE
Problem: Patient Care Overview  Goal: Plan of Care/Patient Progress Review  S-(situation): OT and PT treatment sessions    B-(background): Patient is a 78 year old male scheduled for OT and PT after being admitted via ED after a fall sustained at home. Pt recently diagnosed with metastatic cancer. Per rounding team pt is not appropriate for OT and PT this date due to medical needs.    A-(assessment): Cancel OT and PT sessions 5/9/19    R-(recommendations): Will reassess and reschedule OT and PT sessions tomorrow, 5/10/19, as appropriate.

## 2019-05-10 PROBLEM — A41.9 SEPSIS (H): Status: ACTIVE | Noted: 2019-01-01

## 2019-05-10 PROBLEM — J96.01 ACUTE RESPIRATORY FAILURE WITH HYPOXIA (H): Status: ACTIVE | Noted: 2019-01-01

## 2019-05-10 PROBLEM — R57.9 SHOCK (H): Status: ACTIVE | Noted: 2019-01-01

## 2019-05-10 PROBLEM — K92.1 BLOOD IN STOOL: Status: ACTIVE | Noted: 2019-01-01

## 2019-05-10 PROBLEM — E43 SEVERE MALNUTRITION (H): Status: ACTIVE | Noted: 2019-01-01

## 2019-05-10 PROBLEM — E27.8 MASS OF BOTH ADRENAL GLANDS (H): Status: ACTIVE | Noted: 2019-01-01

## 2019-05-10 PROBLEM — D69.6 THROMBOCYTOPENIA (H): Status: ACTIVE | Noted: 2019-01-01

## 2019-05-10 NOTE — PLAN OF CARE
"Patient has been awake all night despite melatonin and Tylenol. He has been using his call light frequently with no specific requests other than \"I am so tired\" or \"I had a dream\". Pt denies any pain. Blood pressure has improved overnight, however pt has become very frustrated with the amount of cords hooked to him and keeps taking things off and calling out to have \"these ropes away from me\". Pt did allow writer to apply oxygen but often takes it off and then calls that he is having a difficult time breathing. Sats 85-86% on room air and 93-97% on 2L. He remains in SR with rates in the 60's-70's. Right lung coarse with mild wheeze throughout both sides. Pt did allow 2 nebs given by blow by. He has tolerated thickened liquids during the night without any coughing. No further stools. He has been incontinent of urine. Pt is able to move around in bed independently but appears very fatigued and weak in general.   "

## 2019-05-10 NOTE — SIGNIFICANT EVENT
Significant Event Note    Time of event: 6:30 PM May 10, 2019    Description of event:    Notified of abnormal test result lactic acid 6.3.  Patient reevaluated.  Blood pressure remains normal.  He opens eyes to voice and is responsive.  He says he just wants to sleep.  Radial pulse is palpable and capillary refill is normal.  He is tachypneic with stable oxygenation on oxygen supplementation.  Other test results are notable for hemoglobin 7.5, decreased from earlier.      Plan:  Urgent blood transfusion recommended because of suspicion for GI bleeding, worsening anemia, borderline hypotension, and rising lactic acid level.  Patient declines blood transfusion at this time.  He was informed that he may die within the next several hours or day without blood transfusion and/or other interventions and appeared to understand the risk.  He was informed that if he changes his mind and provides consent for this procedure, we will order blood transfusion.    Discussed with: bedside nurse and charge nurse    Hao Eduardo MD

## 2019-05-10 NOTE — PROGRESS NOTES
[facility]    Medicine Progress Note - Hospitalist Service       Date of Admission:  5/6/2019  Assessment & Plan    78-year-old man admitted with metastatic cancer, severe malnutrition, weakness, and acute kidney injury who acutely deteriorated yesterday coinciding with onset of paroxysmal atrial fibrillation with rapid ventricular response.  Severe hypotension improved with fluid resuscitation and cardioversion after loading IV amiodarone, but it did not resolve.  Although a localized infection has not been identified with certainty, he has had a persistently elevated procalcitonin level with worsening lactic acid level yesterday and was therefore started on broad-spectrum empiric parenteral antibiotics overnight.  Because of adrenal masses from metastatic cancer and possible adrenal insufficiency, he was also started on stress doses of corticosteroids overnight.  With these interventions, severe hypotension has resolved and lactic acid level is starting to improve.  This increases suspicion for sepsis and possibly relative adrenal insufficiency is contributing to shock.  He had grossly bloody stool that was Hemoccult positive yesterday and has been anemic, but his hemoglobin has been stable for the last 2 days arguing against active bleeding causing hypotension yesterday.  Source for GI bleeding is not clear but could be due to metastatic cancer.  Thrombocytopenia has worsened and could be multifactorial including sepsis and bleeding, but active bleeding is not apparent currently.  Metabolic acidosis with normal anion gap persists and could be due to bicarbonate losses in stool although he is not having frequent diarrhea and/or to acute kidney injury although renal function has been improving.  Bowel ischemia is possible, but he has not had abdominal pain or tenderness.    Principal Problem:    Metastatic cancer (H)  Active Problems:    Shock (H)    Severe malnutrition (H) - due to  chronic disease    Acute kidney injury superimposed on chronic kidney disease (H)    Blood in stool    Thrombocytopenia (H)    Mass of both adrenal glands (H) - suspected mets    Sepsis (H) - possible    Acute respiratory failure with hypoxia (H)    Tobacco use disorder    Advanced directives, counseling/discussion    Hypertension goal BP (blood pressure) < 140/90    CKD (chronic kidney disease) stage 3, GFR 30-59 ml/min (H)    Fall at home, initial encounter    Decreased oral intake    Urinary retention    Generalized muscle weakness    Rapid atrial fibrillation (H)    Continue IV fluids with bicarbonate and adjust depending upon blood gases until metabolic acidosis subsides  Continue IV hydrocortisone empirically  Continue broad-spectrum antibiotics, follow-up on culture results and follow serial procalcitonin levels  Start low-dose oral amiodarone to try to prevent recurrent episodes of atrial fibrillation given the severity of the episode yesterday and his other severe comorbidities  Follow serial hemoglobin and consider blood transfusion if hemoglobin falls to 7, hemodynamic instability recurs, or active bleeding ensues  Follow platelet count, avoiding anticoagulation  Follow lactic acid level  Start proton pump inhibitor because of GI bleeding  Discussed severity of his acute illness as well as suspected metastatic cancer with the patient and his son today and will reassess his disposition plan depending upon his clinical course over the next 1 to 2 days  Transfer to the floor today off telemetry    Diet: Dysphagia Diet Level 1 Pureed Nectar Thickened Liquids (pre-thickened or use instant food thickener)  Snacks/Supplements Adult: Boost Breeze; Between Meals  Snacks/Supplements Adult: Other; Smooth yogurt or pudding; Between Meals  Snacks/Supplements Adult: Magic Cup; Between Meals    DVT Prophylaxis: Pneumatic Compression Devices  Hansen Catheter: not present  Code Status: DNR/DNI      Disposition Plan    Expected discharge: 3 to 5 days, recommended to To be determined once Medically stable.  Entered: Hao Eduardo MD 05/10/2019, 10:18 AM       The patient's care was discussed with the Bedside Nurse, Care Coordinator/, Patient and Patient's Family.    Hao Eduardo MD  Hospitalist Service  [facility]    ______________________________________________________________________    Interval History   Patient does not provide much history.  He says he feels tired and wants to sleep.  He has not had much sleep in the last 2 days here at the hospital.  He denies abdominal pain.  His son reports that the patient complains of pain in his hips and buttocks from lying in bed at times.  He denies nausea or vomiting although has not had much oral intake.  He seems dyspneic but does not report shortness of breath.  He has been afebrile and hemodynamically stable since about 3 AM today when his blood pressure normalized.  He continues to be tachypneic and has been hypoxic requiring supplemental oxygen.  Urine output has been adequate and he is voiding spontaneously.  He has not had another bowel movement since his large loose stool late yesterday.  Because of persistent hypotension last evening with rising lactic acid level, patient was started on IV hydrocortisone and broad-spectrum IV antibiotics overnight.    Data reviewed today: I reviewed all medications, new labs and imaging results over the last 24 hours. I personally reviewed telemetry which has not demonstrated any dysrhythmias overnight.    Physical Exam   Vital Signs: Temp: 97.7  F (36.5  C) Temp src: Oral BP: 90/42 Pulse: 77 Heart Rate: 78 Resp: 20 SpO2: 95 % O2 Device: Nasal cannula Oxygen Delivery: 2 LPM  Weight: 184 lbs 4.87 oz  General Appearance: Ill-appearing with mild respiratory distress, appears tired  Respiratory: Tachypneic with otherwise normal respiratory effort, diminished breath sounds throughout with  clear lung fields  Cardiovascular: Regular rate and rhythm, palpable radial pulse with normal capillary refill  GI: Nondistended abdomen, soft, no apparent abdominal tenderness  Skin: No rashes evident, pale color, very dry flaking skin on the lower legs  Other: Moderate pitting edema in the lower extremities bilaterally    Data   Recent Labs   Lab 05/10/19  0557 05/09/19 2017 05/09/19  0933 05/09/19  0540 05/08/19  0549  05/07/19  0520 05/06/19  0754  05/03/19  1311   WBC 15.8*  --   --  11.7* 11.3*  --  14.5* 17.4*   < >  --    HGB 8.2* 9.0*  --  8.4* 8.6*   < > 9.4* 12.7*   < >  --      --   --   --  98  --  96 95   < >  --    *  --   --   --  119*  --  135* 165   < >  --      --   --  145* 147*  --  139 140  --  135   POTASSIUM 4.2  --   --  3.9 4.2  --  4.6 4.8  --  4.6   CHLORIDE 113*  --   --  116* 118*  --  108 102  --  101   CO2 18*  --   --  17* 17*  --  19* 19*  --  22   BUN 66*  --   --  73* 81*  --  100* 87*  --  65*   CR 1.47*  --   --  1.50* 1.48*   < > 1.84* 1.82*  --  1.59*   ANIONGAP 11  --   --  12 12  --  12 19*  --  12   RIZWANA 8.0*  --   --  8.1* 7.7*  --  8.2* 9.6  --  8.7   *  --  97 97 73  --  83 79  --  100*   ALBUMIN  --   --   --   --   --   --   --  2.5*  --  2.4*   PROTTOTAL  --   --   --   --   --   --   --  5.6*  --  5.8*   BILITOTAL  --   --   --   --   --   --   --  6.5*  --  5.3*   ALKPHOS  --   --   --   --   --   --   --  639*  --  594*   ALT  --   --   --   --   --   --   --  243*  --  128*   AST  --   --   --   --   --   --   --  319*  --  155*   LIPASE  --   --   --   --   --   --   --   --   --  748*   TROPI  --   --   --   --   --   --   --   --   --  <0.015    < > = values in this interval not displayed.     Echocardiogram yesterday demonstrated normal atrial size bilaterally, no significant valvular disease, and normal LV and RV systolic function as well as normal IVC    Stool testing for occult blood was positive yesterday    Lactic acid at 8 PM  yesterday evening was 5.8, serum cortisol yesterday was 31.5    Blood cultures continue to be negative so far including those repeated yesterday    Medications     IV infusion builder WITH additives 100 mL/hr at 05/09/19 2154       heparin lock flush  5-10 mL Intracatheter Q24H     hydrocortisone sodium succinate PF  50 mg Intravenous Q6H     lactated ringers  500 mL Intravenous Once     piperacillin-tazobactam  3.375 g Intravenous Q6H     vancomycin (VANCOCIN) IV  1,500 mg Intravenous Q24H

## 2019-05-10 NOTE — PROVIDER NOTIFICATION
Patient refuses O2 via NC at this time.   He has been explained the risks and the benefits and the answer is the same, NO.  MD has been made aware.  Plan is to keep him monitored with continuous pulse ox, okay to turn down alarms.   Plan to continue to offer O2.

## 2019-05-10 NOTE — PLAN OF CARE
S-(situation): Physical Therapy per plan of care    B-(background): Patient is a 78 year old male admitted following recent falls and increased weakness. Recent diagnosis of metastatic cancer. Medical history includes AFib, HTN, CKD SKI, urinary retention, tobacco use disorder. Previously home alone.    A-(assessment): Patient lying in bed clutching overhead trapeze with HOB elevated. Patient refused PT intervention, reports his doctor told him to rest and he does not need to do anything but try to sleep. Attempted to encourage patient to decrease pull on overhead trapeze and attempted relaxation discussion however patient refused all education.    R-(recommendations): Assess if willing to participate this afternoon, otherwise continue daily treatment 5/11/19 per POC.    Thank you for your referral.    Kathleen Arredondo, PT, DPT, ATC    [facility]    O: [phone]  E: [email or web address]    Addendum: Attempted second visit to work with patient. Patient refused. Per nursing patient completed transfer to chair and had just transferred back to bed with Ax1 prior to PT's arrival. See above for plan.    Digna Aldridge PT, DPT  [facility]  [phone]

## 2019-05-10 NOTE — PLAN OF CARE
"Problem: Patient Care Overview  Goal: Plan of Care/Patient Progress Review  S-(situation): Occupational therapy treatment    B-(background): Pt is a 78 year old male admitted due to recent falls, increased weakness, and a recent diagnosis of metastatic cancer.     A-(assessment): Pt refused OT treatment session this date, stating \"I just want to sleep.\"     R-(recommendations): Continue OT POC 5/11/19 or as appropriate.    Ramona Logan OTR/L  [facility] [address] [facility]  [phone]  "

## 2019-05-10 NOTE — PROGRESS NOTES
DATE:  5/10/2019   TIME OF RECEIPT FROM LAB:  1820  LAB TEST:  Lactic  LAB VALUE:  6.3  RESULTS GIVEN WITH READ-BACK TO (PROVIDER):  Dr. Eduardo  TIME LAB VALUE REPORTED TO PROVIDER:   1820

## 2019-05-10 NOTE — PROGRESS NOTES
CLINICAL NUTRITION SERVICES - REASSESSMENT NOTE    RECOMMENDATIONS FOR MD/PROVIDER TO ORDER: None at this time   Recommendations Ordered by Registered Dietitian (RD):   -Nursing to encourage intake whenever possible  -Provide as many of pt's preferred foods as possible  -Boost Breeze between meals  -Magic cup hs snack.  -Yogurt or pudding daily snack  -Education on increasing kcal/protein provided 5/8.    Future/Additional Recommendations: follow pt's goals, plan of care   Malnutrition: Severe malnutrition. In Context of:  Chronic illness or disease       EVALUATION OF PROGRESS TOWARD GOALS   Diet:  Dysphagia 1 Pureed, Nectar thickened liquids  Boost breeze between meals 10 AM and 2 PM  Magic cup hs snack  Smooth yogurt/pudding at 2PM    Intake/Tolerance:  This AM ate 50% breakfst- most of oatmeal, really liked that.   Pt refused to eat yesterday 5/9. 5/8 ate few bites of breakfast and 25% lunch.   Very ill with metastatic cancer, very poor appetite.     ASSESSED NUTRITION NEEDS:  Dosing Weight:  83.6 kg today. Up 8.6-11.6 kg from 5/6 weights of 72 kg and 75 kg. Question accuracy of today's weight. Up 7.8 Liters.  See prior note for calculated nutrient needs      NEW FINDINGS:   Pt was moved into ICU for blood pressure issues 5/9, back to floor today  Spoke with pt and son this AM - liked the Boost breeze and magic cup, will continue.  Really liked oatmeal this AM.    Loved the pudding - banana/vanilla is his favorite. Will continue to send. Made preference notes for foodservice staff.   Discussed with nurse encouraging intake as pt will allow - shortness of breath, lethargy impairing at this time.   Prognosis not good.     Labs: Na 142, K 4.2  BUN high 66, Creat up 1.47. Calcium low 8.0. BG high 167-201 today.     Previous Goals:   PO intake of >50% meals, snacks, and supplements  Maintain wt at 72 kg  Continue to monitor and encourage PO intake     Evaluation: Not met    Previous Nutrition Diagnosis:   Inadequate  protein-energy intake related to poor appetite/dysphagia as evidenced by usual dietary intake meeting <75% estimated needs, need for pureed texture diet with nectar thick liquids, and wt loss of 31 lb/16.4% BW over the past 10 months.    Evaluation: Declining      MALNUTRITION:  % Weight Loss:  > 10% in 6 months (severe malnutrition)  % Intake:  </= 75% for >/= 1 month (severe malnutrition)  Subcutaneous Fat Loss:  None observed  Muscle Loss:  None observed  Fluid Retention:  +2 mild edema, feet per EMR      Malnutrition Diagnosis: Severe malnutrition  In Context of:  Chronic illness or disease    CURRENT NUTRITION DIAGNOSIS  Inadequate protein-energy intake related to poor appetite/illness/dysphagia as evidenced by eating <50% needs >1 week, need for pureed texture diet and nectar thick liquids, 31 lb/16.4% weight loss in the past 10 months.     INTERVENTIONS  Recommendations / Nutrition Prescription  -Nursing to encourage intake whenever possible  -Provide as many of pt's preferred foods as possible  -Boost Breeze between meals  -Magic cup hs snack.  -Yogurt or pudding daily snack  -Education on increasing kcal/protein provided 5/8.       Implementation  Composition of Meals and Snacks, Medical Food Supplement and Collaboration and Referral of Nutrition care    Goals  Pt to consume >50% of meals, snacks, and supplements  Pt to maintain weight at or above 72 kg.     MONITORING AND EVALUATION:  Progress towards goals will be monitored and evaluated per protocol and Practice Guidelines, Diet Order, Food intake, Liquid meal replacement or supplement and Weight

## 2019-05-10 NOTE — PROGRESS NOTES
S- Transfer to 268 from ICU/215    B-metastatic CA    A- Patient on 1-2L 02 NC to maintain sats @ 94-96%, Patient is dyspneic and pulling 02 off at times. Oriented to person and place. Bp controlled. patient is afebrile.     R- Transfer to 268 per physician orders. Continue to monitor pt and update physician as needed.      Code status: DNR / DNI  Skin: dry   Fall Risk: Yes- Department fall risk interventions implemented.  Isolation: None  Core Measures: NA  Medication drips upon transfer: fluids

## 2019-05-10 NOTE — PROGRESS NOTES
DATE:  5/10/2019   TIME OF RECEIPT FROM LAB:10:12  LAB TEST: Lactic  LAB VALUE:  5.2  RESULTS GIVEN WITH READ-BACK TO (PROVIDER):  Dr. Eduardo  TIME LAB VALUE REPORTED TO PROVIDER:   10:14

## 2019-05-10 NOTE — PROGRESS NOTES
S-(situation): Note    B-(background): Falls/Ca with mets    A-(assessment): Please review system assessment and VS.  Note that pt is refusing supper tonight.  Was incont of a large amt of dark color urine.  Pt's skin-his whole body is scaly.  Oxycodone given for discomfort.  Family here and is supportive.      R-(recommendations): Cont poc as ordered.

## 2019-05-10 NOTE — PROVIDER NOTIFICATION
Patient refusing to wear oxygen.  SATs in mid to low 80s.  Offered NC, oxymask, O2 via blowby, and patient refusing all options.  Explained why patient is requiring oxygen, and risks of refusing O2.  Verbalizes understanding.  Continues to refuse.  MD notified.

## 2019-05-10 NOTE — PROGRESS NOTES
Patient is continuing not to improve, not clear if underlying infection with a high procalcitonin, persistent elevated lactic acids, hypotension although blood cultures have been negative and there is no clear source of infection.   At this time will start emperic antibiotics in a last chance effort to potentially help turn him around. Will order zosyn and vancomycin. Continue to follow clinically, although his prognosis continues to be poor.   Electronically signed by MELLY Quinn on May 9, 2019

## 2019-05-10 NOTE — CONSULTS
Pharmacy Vancomycin Initial Note  Date of Service May 9, 2019  Patient's  1940  78 year old, male    Indication: Sepsis    Current estimated CrCl = Estimated Creatinine Clearance: 42.7 mL/min (A) (based on SCr of 1.5 mg/dL (H)).    Creatinine for last 3 days  2019:  5:20 AM Creatinine 1.84 mg/dL;  1:11 PM Creatinine 1.61 mg/dL  2019:  5:49 AM Creatinine 1.48 mg/dL  2019:  5:40 AM Creatinine 1.50 mg/dL    Recent Vancomycin Level(s) for last 3 days  No results found for requested labs within last 72 hours.      Vancomycin IV Administrations (past 72 hours)      No vancomycin orders with administrations in past 72 hours.                Nephrotoxins and other renal medications (From now, onward)    Start     Dose/Rate Route Frequency Ordered Stop    19 2130  vancomycin (VANCOCIN) 1,500 mg in sodium chloride 0.9 % 250 mL intermittent infusion      1,500 mg  over 90 Minutes Intravenous EVERY 24 HOURS 19 2100  piperacillin-tazobactam (ZOSYN) infusion 3.375 g     Note to Pharmacy:  Pharmacy can adjust dose based on renal function.    3.375 g  100 mL/hr over 30 Minutes Intravenous EVERY 6 HOURS 19            Contrast Orders - past 72 hours (72h ago, onward)    None                Plan:  1.  Start vancomycin  1500 mg IV q24h.   2.  Goal Trough Level: 15-20 mg/L   3.  Pharmacy will check trough levels as appropriate in 1-3 Days.    4. Serum creatinine levels will be ordered daily for the first week of therapy and at least twice weekly for subsequent weeks.    5. [place] method utilized to dose vancomycin therapy: Method 1    Shawn León

## 2019-05-10 NOTE — PROGRESS NOTES
Blood pressures trending down,  With heart rate in 60-70 range, sinus. Has received multiple boluses. Cortisol pending with concern for adrenal insufficiency due to cancer. Labs, lactate and hgb pending. Has refused central line placement, so not wanting to start pressor support. Will order solucortef and see if this helps, and follow. Electronically signed by MELLY Quinn on May 9, 2019

## 2019-05-11 PROBLEM — K92.2 GI BLEED: Status: ACTIVE | Noted: 2019-01-01

## 2019-05-11 PROBLEM — Z51.5 COMFORT MEASURES ONLY STATUS: Status: ACTIVE | Noted: 2019-01-01

## 2019-05-11 PROBLEM — D62 ANEMIA DUE TO BLOOD LOSS, ACUTE: Status: ACTIVE | Noted: 2019-01-01

## 2019-05-11 NOTE — PROGRESS NOTES
Care Transitions continues to follow this patient.  He is now on comfort cares.  Will look into placement options on Monday if needed.

## 2019-05-11 NOTE — PROGRESS NOTES
[facility]    Medicine Progress Note - Hospitalist Service       Date of Admission:  5/6/2019  Assessment & Plan    78-year-old man found to have suspected metastatic cancer at admission for which she has declined additional diagnostic evaluation or specific treatment whose clinical course during hospitalization has been complicated by rapid paroxysmal atrial fibrillation, shock probably for multiple reasons with persistently elevated lactic acid level that has progressively increased despite treatment of identified underlying problems, worsening anemia with gross GI bleeding, possible sepsis although localized infection has not been identified and cultures have been negative, and possible relative adrenal insufficiency although random cortisol was elevated.  He has expressed preference to avoid aggressive diagnostic evaluation and certain treatments including blood transfusion.  He now is expressing wishes consistent for comfort measures only and this appears to be aligned with preferences that he has previously discussed with his family.  It appears medically appropriate in the context of his suspected metastatic cancer with ongoing complications including suspected GI bleeding and shock.  Prognosis appears terminal although timing is as yet uncertain as death does not currently appear to be imminent in the next 1 to 2 days.    Principal Problem:    Comfort measures only status  Active Problems:    Metastatic cancer (H)    Shock (H)    Severe malnutrition (H) - due to chronic disease    Acute kidney injury superimposed on chronic kidney disease (H)    GI bleed    Thrombocytopenia (H)    Mass of both adrenal glands (H) - suspected mets    Sepsis (H) - possible    Acute respiratory failure with hypoxia (H)    Anemia due to blood loss, acute    Tobacco use disorder    Advanced directives, counseling/discussion    Hypertension goal BP (blood pressure) < 140/90    CKD (chronic kidney  disease) stage 3, GFR 30-59 ml/min (H)    Fall at home, initial encounter    Decreased oral intake    Urinary retention    Generalized muscle weakness    Rapid atrial fibrillation (H)    Transition to comfort measures only  Use oxycodone or morphine in liquid form as needed for pain or dyspnea  Use oxygen as needed for relief of dyspnea  Avoid routine monitoring of vital signs and will not pursue additional diagnostic testing  Will not replace IV access which was lost overnight and will discontinue IV medications including antibiotics and corticosteroids accordingly  Discussed disposition planning and recommended discharge to skilled nursing or hospice facility for end-of-life care depending upon his clinical course over the next 1 to 2 days    Diet: Dysphagia Diet Level 1 Pureed Nectar Thickened Liquids (pre-thickened or use instant food thickener)  Snacks/Supplements Adult: Boost Breeze; Between Meals  Snacks/Supplements Adult: Other; Smooth yogurt or pudding; Between Meals  Snacks/Supplements Adult: Magic Cup; Between Meals    DVT Prophylaxis: Now comfort care only  Hansen Catheter: not present  Code Status: DNR/DNI      Disposition Plan   Expected discharge: 2 - 3 days, recommended to Skilled nursing facility once he has a safe disposition plan assuming he has not rapidly deteriorated over the next 1 to 2 days.  Entered: Hao Eduardo MD 05/11/2019, 4:12 PM       The patient's care was discussed with the Care Coordinator/, Patient and Patient's Family.  Total floor time for this patient today 40 minutes including 25 minutes spent in direct face-to-face counseling and discussion with the patient and his family with particular attention to end-of-life care.  Hao Eduardo MD  Hospitalist Service  [facility]    ______________________________________________________________________    Interval History   Patient currently says he wants to sit up but otherwise offers  no complaints.  He denies pain.  He denies shortness of breath.  Nursing reports that they have been administering oral oxycodone tablets which do seem to provide symptomatic benefit for pain and dyspnea.  He has been resting in bed most of the day.  He does not recall seeing his family although family says they have visited with him today.  Nursing reports that he has been more confused and is having difficulty following instructions reliably.  He has been afebrile and his vital signs today have been stable so far.  Oxygenation has been normal on oxygen supplementation with stable oxygen requirement.  Urine output has been poor.  He had a small amount to eat at breakfast but not since and says he is not hungry currently.  He has not had any recurrent bowel movements yet today.  Nursing reports that peripheral IV access was lost overnight and that the patient declined replacing his peripheral IV.  They also report that the patient declined having his morning lab tests drawn today.    Data reviewed today: I reviewed all medications, new labs and imaging results over the last 24 hours. I personally reviewed no images or EKG's today.    Physical Exam   Vital Signs: Temp: 96.2  F (35.7  C) Temp src: Oral BP: 128/59 Pulse: 88 Heart Rate: 74 Resp: 18 SpO2: 94 % O2 Device: Nasal cannula Oxygen Delivery: 2 LPM  Weight: 182 lbs 12.18 oz  General Appearance: Ill-appearing without signs of acute distress  Respiratory: Normal respiratory effort, mildly diminished breath sounds throughout with clear lung fields  Cardiovascular: Regular rate and rhythm, palpable radial pulse, capillary refill 2 to 3 seconds in the fingertips  GI:   Skin: Pale color  Other: Somnolent, opens eyes to voice and responds vocally to questions with speech that is generally understandable and can respond appropriately with yes and no answers although does not otherwise appear able to respond to more complicated questions, not able to follow instructions  reliably but is attempting to sit up in his bed while holding onto the bed rail using both arms and moving both legs    Data   Recent Labs   Lab 05/10/19  1816 05/10/19  0557 05/09/19 2017 05/09/19  0933 05/09/19  0540 05/08/19  0549  05/07/19  0520 05/06/19  0754   WBC  --  15.8*  --   --  11.7* 11.3*  --  14.5* 17.4*   HGB 7.5* 8.2* 9.0*  --  8.4* 8.6*   < > 9.4* 12.7*   MCV  --  100  --   --   --  98  --  96 95   PLT  --  111*  --   --   --  119*  --  135* 165   INR 2.39*  --   --   --   --   --   --   --   --     142  --   --  145* 147*  --  139 140   POTASSIUM 4.0 4.2  --   --  3.9 4.2  --  4.6 4.8   CHLORIDE 107 113*  --   --  116* 118*  --  108 102   CO2 19* 18*  --   --  17* 17*  --  19* 19*   BUN  --  66*  --   --  73* 81*  --  100* 87*   CR  --  1.47*  --   --  1.50* 1.48*   < > 1.84* 1.82*   ANIONGAP 15* 11  --   --  12 12  --  12 19*   RIZWANA  --  8.0*  --   --  8.1* 7.7*  --  8.2* 9.6   GLC  --  179*  --  97 97 73  --  83 79   ALBUMIN  --   --   --   --   --   --   --   --  2.5*   PROTTOTAL  --   --   --   --   --   --   --   --  5.6*   BILITOTAL  --   --   --   --   --   --   --   --  6.5*   ALKPHOS  --   --   --   --   --   --   --   --  639*   ALT  --   --   --   --   --   --   --   --  243*   AST  --   --   --   --   --   --   --   --  319*    < > = values in this interval not displayed.     Medications       amiodarone  100 mg Oral Daily     pantoprazole  40 mg Oral BID AC

## 2019-05-11 NOTE — PLAN OF CARE
"S-(situation): Physical therapy treatment     B-(background): Per EMR PT is 77y/o male admitted due to recent falls  and weakness. Recent diagnosis of metastatic cancer and HX of Afib, HTN, CKD, SKO and Urinary retention  previously living indep at home.      A-(assessment): Per EMR and consultation with nursing pt Pt has refused therapies 5/9 and 5/10. and medical treatment including \" Urgent\" blood transfusion for HGB 7.5 and elevated lactic acid. MD counseled pt of risk of death with out transfusion.       R-(recommendations): Continue PT POC 5/11/19 is NOT appropriate, due to  refusals of treatment sessions.  Discharge disposition may be better served with Hospice or home health care services vs TCU.  No further inpatient PT services at this time.     Thank you for this referral. PT orders completed today. Please reconsult if Pt status changes and he wishes to participate in therapies.   "

## 2019-05-11 NOTE — PLAN OF CARE
"Problem: Patient Care Overview  Goal: Plan of Care/Patient Progress Review    S-(situation): Occupational therapy treatment     B-(background): Pt is a 78 year old male admitted due to recent falls, increased weakness, and a recent diagnosis of metastatic cancer.      A-(assessment): Per [facility] chart review and report: has refused OT treatment sessions stating \"I just want to sleep.\" .  Additionally, currently per Epic chart review and report; he is refusing some medical treatment.     R-(recommendations): Continue OT POC 5/11/19 is NOT appropriate, due to resistance and refusals of treatment sessions.  Discharge disposition may be better served with Hospice or home health care services vs TCU.  No further inpatient OT services at this time.    MARJAN Frazier/L  [facility]  [phone]       "

## 2019-05-11 NOTE — PROVIDER NOTIFICATION
Pt very antsy in room.  Getting in and out of bed frequently and setting off bed alarm.  Roxicodone x 1 for complaints of pain.  Pt pulled out IV access at end of zosyn and still needing vanco dose.  Pt is refusing to have a new IV started.  MD notified and aware patient is refusing to have a new IV placed and stated to leave it out if patient refused.

## 2019-05-11 NOTE — PLAN OF CARE
Pt.has decreased but clear lungs.He has mid abdominal pain and also low back pain.He has been getting oxycodone 10mg every 4 hours or so for pain.Pt.has been wanting to rest most of the shift.Lots of family in there currently.He has been declining I.V.restart so he has not gotten any I.V.Meds.He takes his pills with apple sauce.He is incontinent of urine.Pt.has lots of yellowish clear drainage in left foot.His feet and lower legs have pitting edema.

## 2019-05-11 NOTE — PROGRESS NOTES
Pt.is restless and wants to sit on edge of bed frequently.He is weak and can't maintain his position for very long.Charge nurse in room and will plan VPM for now.One of his 3 sons is in the room and he was updated.

## 2019-05-11 NOTE — PROGRESS NOTES
S-(situation): impulsive with sitting at side of bed, forgetting to put call light on for help    B-(background): Falls/ new CA with mets diagnosis    A-(assessment): non-compliant, not using call light, fall risk    R-(recommendations): VPM initiated, VPM #62, called VPM tech

## 2019-05-11 NOTE — PLAN OF CARE
"Pt up entire night, constantly yelling out to staff.  Frequently attempting to get self out of bed, but once sitting at bedside says \"I just want to lie down and sleep\".  Bed alarm on for safety. Sats mid 90's on 2L via NC, dyspneic with activity, crackles in RLL.  Mostly incontinent of urine but does use urinal at times - bladder scanned after patient stated he had to void and had no returns but scanner showed no urine in bladder.  IV access lost and patient refusing to have a new IV restarted so unable to give scheduled IV medications - MD aware.  Bilat LE's edematous and weepy.  Roxicodone x 1.  "

## 2019-05-12 NOTE — PLAN OF CARE
Pt less agitated overnight, minimal conversation with staff.  Has not attempted to get OOB on his own, VPM in use.  Oral ativan given x 1.  Incontinent of urine.  T & R for comfort.  Lower legs continue to weep, chux pads in place.

## 2019-05-12 NOTE — PROGRESS NOTES
Pt's lower extremities very cool to touch and have started to mottle.  Respirations shallow and gurgly.  Blood pressure 77/38, HR 73, sats 79% on 2L via NC.  Pt mostly unresponsive, except moaned with turning.  Son Gadiel called at [phone] with update.  Gadiel will notify other family members and come to hospital.  VPM cancelled due to patient's non responsiveness and not attempting to get OOB

## 2019-05-12 NOTE — PLAN OF CARE
"Pt alert to self and place. Pt has been uncomfortable this shift, turning self frequently, placing legs on/in between side rails. Has been able to rest for up to 30 minutes at a time. Pt keeps stating \"Help me get up.\" Have helped pt many times sit up on edge of bed with 2 assist. Pt drowsy and weak with movement. Helped pt sit on commode and pt was almost unable to stand to get back to bed. Has been incontinent this shift. Continues to call out for help intermittently. Pt given Zyprexa 5 mg at 1740 for agitation/restlessness. Roxanol given every 2-3 hours. Pt had 3 does of Roxanol this shift. Gave pt Ativan 2 mg PO at 2100 since Zyprexa helped minimally. Family here this evening visiting patient, very pleasant. Son, Gadiel, wants to be notified for any changes in patient's condition. Pt's lower legs weeping and placed on absorbant pads. Ongoing explanation provided to pt. Will continue to monitor.   "

## 2019-05-12 NOTE — PROGRESS NOTES
Pt  at 706.  Son Gadiel notified, was just pulling into parking lot of hospital.  Unsure of  home currently - awaiting a sister to help with decision making.  Donor line called - patient ruled out for tissue donation.  On coming nurse Marline to take over the care of patient.

## 2019-05-12 NOTE — DEATH PRONOUNCEMENT
MD DEATH PRONOUNCEMENT    Called to pronounce Bhaskar lei.    Physical Exam: Unresponsive to noxious stimuli, Spontaneous respirations absent, Breath sounds absent, Carotid pulse absent, Heart sounds absent and Pupillary light reflex absent    Patient was pronounced dead at 7:06 AM, May 12, 2019.    Preliminary Cause of Death: Metastatic cancer    Principal Problem:    Metastatic cancer (H)  Active Problems:    Shock (H)    Comfort measures only status    Severe malnutrition (H) - due to chronic disease    Acute kidney injury superimposed on chronic kidney disease (H)    GI bleed    Thrombocytopenia (H)    Mass of both adrenal glands (H) - suspected mets    Sepsis (H) - possible    Acute respiratory failure with hypoxia (H)    Anemia due to blood loss, acute    Tobacco use disorder    Advanced directives, counseling/discussion    Hypertension goal BP (blood pressure) < 140/90    CKD (chronic kidney disease) stage 3, GFR 30-59 ml/min (H)    Fall at home, initial encounter    Decreased oral intake    Urinary retention    Generalized muscle weakness    Rapid atrial fibrillation (H)       Infectious disease present?: NO    Communicable disease present? (examples: HIV, chicken pox, TB, Ebola, CJD) :  NO    Multi-drug resistant organism present? (example: MRSA): NO    Please consider an autopsy if any of the following exist:  NO Unexpected or unexplained death during or following any dental, medical, or surgical diagnostic treatment procedures.   NO Death of mother at or up to seven days after delivery.     NO All  and pediatric deaths.     NO Death where the cause is sufficiently obscure to delay completion of the death certificate.   NO Deaths in which autopsy would confirm a suspected illness/condition that would affect surviving family members or recipients of transplanted organs.     The following deaths must be reported to the 's Office:  NO A death that may be due entirely or in part to any  factors other than natural disease (recent surgery, recent trauma, suspected abuse/neglect).   NO A death that may be an accident, suicide, or homicide.     NO Any sudden, unexpected death in which there is no prior history of significant heart disease or any other condition associated with sudden death.   NO A death under suspicious, unusual, or unexpected circumstances.    NO Any death which is apparently due to natural causes but in which the  does not have a personal physician familiar with the patient s medical history, social, or environmental situation or the circumstances of the terminal event.   NO Any death apparently due to Sudden Infant Death Syndrome.     NO Deaths that occur during, in association with, or as consequences of a diagnostic, therapeutic, or anesthetic procedure.   NO Any death in which a fracture of a major bone has occurred within the past (6) six months.   NO A death of persons note seen by their physician within 120 days of demise.     NO Any death in which the  was an inmate of a public institution or was in the custody of Law Enforcement personnel.   NO  All unexpected deaths of children   NO Solid organ donors   NO Unidentified bodies   YES Deaths of persons whose bodies are to be cremated or otherwise disposed of so that the bodies will later be unavailable for examination;   NO Deaths unattended by a physician outside of a licensed healthcare facility or licensed residential hospice program   NO Deaths occurring within 24 hours of arrival to a health care facility if death is unexpected.    NO Deaths associated with the decedent s employment.   NO Deaths attributed to acts of terrorism.   NO Any death in which there is uncertainty as to whether it is a medical examiner s care should be discussed with the medical investigator.        Body disposition: Autopsy was discussed with family member:  Family members, Son and Brother in person.  Permission for autopsy was  declined.  Patient had possibly indicated organ donation (eyes) on their 's license.  Case referred to the  who declined.  Body released to the  home.

## 2019-05-12 NOTE — PROGRESS NOTES
Pts.fleece blanket,fleece jacket,hat,picture in picture frame and glasses sent home with Lexx's sister Karla.Awaiting  home to  remains.Pts.son and rest of family have declined any clergy at this time.

## 2019-05-12 NOTE — DISCHARGE SUMMARY
[facility]  Hospitalist Discharge Summary       Date of Admission:  2019  Date of Discharge:  2019  Discharging Provider: Hao Eduardo MD      Discharge Diagnoses   Principal Problem:    Metastatic cancer (H)  Active Problems:    Shock (H)    Comfort measures only status    Severe malnutrition (H) - due to chronic disease    Acute kidney injury superimposed on chronic kidney disease (H)    GI bleed    Thrombocytopenia (H)    Mass of both adrenal glands (H) - suspected mets    Sepsis (H) - possible    Acute respiratory failure with hypoxia (H)    Anemia due to blood loss, acute    Tobacco use disorder    Advanced directives, counseling/discussion    Hypertension goal BP (blood pressure) < 140/90    CKD (chronic kidney disease) stage 3, GFR 30-59 ml/min (H)    Fall at home, initial encounter    Decreased oral intake    Urinary retention    Generalized muscle weakness    Rapid atrial fibrillation (H)      Follow-ups Needed After Discharge   Patient     Unresulted Labs Ordered in the Past 30 Days of this Admission     Date and Time Order Name Status Description    2019 0748 Blood culture Preliminary     2019 0748 Blood culture Preliminary     2019 1719 Blood culture Preliminary     2019 1719 Blood culture Preliminary           Discharge Disposition   Patient  during this admission  Condition at discharge:     Hospital Course   78-year-old man without known history of cancer presented with worsening weakness and falls.  Initial evaluation in the emergency room was strongly suspicious for new diagnosis of widely metastatic cancer, so he was admitted.    Problem #1 metastatic cancer.  Imaging results were suspicious for widely metastatic cancer.  Patient expressed preference to avoid aggressive diagnostic evaluation or treatment for cancer.  He initially desired to treat symptoms and work with therapy services to try to get stronger to return  home.  As his condition worsened during his hospital stay, he subsequently expressed preference for comfort measures only on May 11 which appeared aligned with his previous expressed wishes according to his family.  He subsequently  on May 12, time of death 7:06 AM, preliminary cause of death metastatic cancer.    Problem #2 shock.  During hospitalization, he had persistently elevated lactic acid level that progressively worsened despite aggressive IV fluid resuscitation.  He developed abrupt onset of prolonged hypotension that coincided with new onset rapid atrial fibrillation but also with overt GI bleeding.  Atrial fibrillation cardioverted after initiation of amiodarone therapy.  He declined further evaluation or treatment of GI bleeding including blood transfusion for treatment of acute blood loss anemia.  Blood pressure initially stabilized after treatment of atrial fibrillation, initiation of corticosteroids due to possible relative adrenal insufficiency associated with adrenal masses likely due to metastatic cancer, and initiation of antibiotics due to persistently elevated procalcitonin level although an infection was not identified.  He subsequently expressed preference for comfort measures only on May 11 at which time corticosteroids and antibiotics were discontinued.  Shock with elevated lactic acid level and hypotension was attributed to a combination of rapid atrial fibrillation, GI bleeding, and metastatic cancer.  Adrenal crisis and septic shock were thought less likely after investigation.    Problem #3 rapid nonvalvular atrial fibrillation.  He developed rapid atrial fibrillation and severe hypotension simultaneously.  He did not want electrical cardioversion.  He was treated with IV amiodarone in the ICU and subsequently cardioverted to sinus rhythm.  Echocardiogram did not demonstrate any valvular disease, and atrial size was normal.  Duration of atrial fibrillation during this hospital stay  was less than 12 to 24 hours.  Active GI bleeding was considered a contraindication to anticoagulation.  Paroxysmal atrial fibrillation was attributed to metastatic cancer.    Problem #4 GI bleeding with acute blood loss anemia.  He developed grossly bloody stool and hemoglobin dropped several grams consistent with an acute blood loss anemia.  He declined additional diagnostic evaluation or blood transfusion.  He was treated empirically with a proton pump inhibitor.  GI bleeding was attributed to metastatic cancer.    Problem #5 acute kidney injury.  Renal function was worsened initially as compared with his previous baseline.  It did improve transiently with aggressive IV fluid resuscitation.  Acute kidney injury due to metastatic cancer and hypovolemia was suspected.    Problem #6 severe malnutrition.  Oral intake had been poor recently.  He was evaluated by a dietitian during his hospital stay and received nutritional supplementation initially when he expressed interest in trying to get stronger to return home.  Clinical evaluation including evaluation by the dietitian was consistent with severe malnutrition due to chronic illness from metastatic cancer.    Consultations This Hospital Stay   CARE TRANSITION RN/SW IP CONSULT  PHYSICAL THERAPY ADULT IP CONSULT  CARE COORDINATOR IP CONSULT  SOCIAL WORK IP CONSULT  PHYSICAL THERAPY ADULT IP CONSULT  OCCUPATIONAL THERAPY ADULT IP CONSULT  SWALLOW EVAL SPEECH PATH AT BEDSIDE IP CONSULT  NUTRITION SERVICES ADULT IP CONSULT  PHARMACY TO DOSE VANCO    Code Status   DNR/DNI    Time Spent on this Encounter   I, Hao Eduardo, personally saw the patient today and spent greater than 30 minutes discharging this patient.       Hao Eduardo MD  [facility]  ______________________________________________________________________    Physical Exam     Patient is        Primary Care Physician   Ramona An    Discharge Orders   No  discharge procedures on file.    Significant Results and Procedures   Most Recent 3 CBC's:  Recent Labs   Lab Test 05/10/19  1816 05/10/19  0557 05/09/19  2017 05/09/19  0540 05/08/19  0549  05/07/19  0520   WBC  --  15.8*  --  11.7* 11.3*  --  14.5*   HGB 7.5* 8.2* 9.0* 8.4* 8.6*   < > 9.4*   MCV  --  100  --   --  98  --  96   PLT  --  111*  --   --  119*  --  135*    < > = values in this interval not displayed.     Most Recent 3 BMP's:  Recent Labs   Lab Test 05/10/19  1816 05/10/19  0557 05/09/19  0933 05/09/19  0540 05/08/19  0549    142  --  145* 147*   POTASSIUM 4.0 4.2  --  3.9 4.2   CHLORIDE 107 113*  --  116* 118*   CO2 19* 18*  --  17* 17*   BUN  --  66*  --  73* 81*   CR  --  1.47*  --  1.50* 1.48*   ANIONGAP 15* 11  --  12 12   RIZWANA  --  8.0*  --  8.1* 7.7*   GLC  --  179* 97 97 73     Most Recent 3 INR's:  Recent Labs   Lab Test 05/10/19  1816   INR 2.39*     Most Recent 6 Bacteria Isolates From Any Culture (See EPIC Reports for Culture Details):  Recent Labs   Lab Test 05/09/19  0827 05/09/19  0821 05/07/19  1740 05/07/19  1734   CULT No growth after 3 days No growth after 3 days No growth after 5 days No growth after 5 days   ,   Results for orders placed or performed during the hospital encounter of 05/06/19   CT Chest/Abdomen/Pelvis w Contrast    Narrative    CT CHEST/ABDOMEN/PELVIS WITH CONTRAST   5/6/2019 9:22 AM     HISTORY: Weakness, weight loss, intrahepatic mass, elevated liver  enzymes.    TECHNIQUE: CT of the chest, abdomen, and pelvis was completed with  administration of intravenous contrast. A total of 80 mL Isovue-370  was injected intravenously. Radiation dose for this scan was reduced  using automated exposure control, adjustment of the mA and/or kV  according to patient size, or iterative reconstruction technique.    COMPARISON: None.    FINDINGS:   Chest: Right lower lobe mass is present measuring 2.3 cm. More  inferior subpleural right lower lobe pulmonary nodule is  present  measuring 1 cm. Posteromedial subpleural pulmonary nodule is present  measuring 1.5 cm. Right middle lobe pulmonary nodule is present  measuring 7 mm. Multiple smaller right-sided middle lobe pulmonary  nodules are present. Calcified left upper lobe pulmonary nodule is  present. Bulky right-sided hilar lymphadenopathy is present  collectively measuring up to 3.3 cm. Enlarged low right paratracheal  lymph node is present measuring 1.9 cm. No left-sided hilar  lymphadenopathy is identified. Mild right-sided paraseptal emphysema  is present. Small amount of fluid is present within the  posterior/dependent pericardial recess. Heart size is normal.  Scattered vascular calcifications are present. No evidence of  pericardial effusion.    Abdomen: Innumerable hypoattenuating masses are present throughout the  liver. No evidence of biliary ductal dilatation. Portal and hepatic  veins are patent. Multiple enlarged periportal lymph nodes are present  measuring up to 1.8 cm. Mass is present within the right adrenal gland  measuring up to 3.8 cm. Left adrenal gland demonstrates mild  thickening. A 9 mm mass within the inferolateral left adrenal gland.  Kidneys demonstrate no evidence of hydronephrosis. Bilateral  nonobstructive nephrolithiasis is present. The largest is an 8 mm  stone in the right renal pelvis. Exophytic lesions are present  projecting off the bilateral kidneys which are incompletely  characterized. The left kidney demonstrates an exophytic lesion  measuring 2.3 cm projecting posteriorly. Hounsfield units are  approximately 40. Right kidney demonstrates a lesion projecting off  the superolateral right kidney measuring 1.4 cm with measuring  approximately 30-40 Hounsfield units. Smaller subcentimeter  hypoattenuating lesion is present within the cortex of the right  lateral kidney, likely benign cyst. The abdominal aorta demonstrates  severe atherosclerotic plaquing most severely affecting the  infrarenal  abdominal aorta. There is approximately 60-70% narrowing. The left  common iliac artery is occluded. Severe plaquing is present throughout  the more peripheral branches with possible occlusion of the left  proximal femoral vasculature. No thickened or dilated loops of bowel  are identified. Sigmoid diverticulosis is present. Appendix is  unremarkable. No evidence of retroperitoneal lymphadenopathy.    Pelvis: Bladder is unremarkable. No free fluid in the pelvis. Small  fat-containing right-sided inguinal hernia is present. Small bilateral  testicular hydroceles are noted with small associated scrotal  calcifications.    Bone windows demonstrate lytic mass within the left lateral fourth  rib. No other destructive or aggressive osseous lesions are  identified. Mild to moderate spinal and pelvic degenerative change is  present.      Impression    IMPRESSION:   1. Right lower lobe lung mass, multiple pulmonary nodules, and bulky  right hilar/mediastinal lymphadenopathy consistent with metastatic  disease.  2. Innumerable masses throughout the liver and enlarged periportal  lymph nodes consistent with metastatic disease.   3. Lytic mass in the left lateral fourth rib consistent with  metastatic disease.  4. Nonspecific bilateral adrenal masses (right larger than left),  suspicious for metastatic disease.  5. 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  6. Bilateral exophytic renal lesions which are incompletely  characterized, statistically likely benign/hyperdense cysts. Bilateral  nonobstructive nephrolithiasis.  7. Sigmoid diverticulosis without evidence of diverticulitis.    MARCUS CLIFTON MD   XR Pelvis and Hip Bilateral 2 Views    Narrative    PELVIS AND HIP BILATERAL TWO VIEWS   5/6/2019 9:23 AM     HISTORY: Fall 24-hours ago--cannot stand.    COMPARISON: None.    FINDINGS: None.      Impression    IMPRESSION: No fracture is identified. Sacrum is  obscured by bowel  gas. Lower lumbar spine degenerative change is present. Mild bilateral  hip degenerative change is present. Soft tissues are unremarkable.    MARCUS CLIFTON MD   XR Chest Port 1 View    Narrative    CHEST PORTABLE ONE VIEW May 8, 2019 9:57 AM     HISTORY: Worsening shortness of breath, new crackles on exam in  patient with now metastatic cancer. Acute kidney injury, receiving  lots of fluids in the past 24 hours, concern for volume overload.    COMPARISON: 5/3/2019.      Impression    IMPRESSION: There are increased interstitial markings bilaterally  likely due to edema in this clinical setting. Cardiac size is normal.  There is probable right hilar lymphadenopathy. There is erosion of the  left fourth rib posterolaterally.    SHEA ARROYO MD       Discharge Medications   Current Discharge Medication List      STOP taking these medications       hydrochlorothiazide (HYDRODIURIL) 25 MG tablet Comments:   Reason for Stopping:         lisinopril (PRINIVIL/ZESTRIL) 20 MG tablet Comments:   Reason for Stopping:         lisinopril (PRINIVIL/ZESTRIL) 40 MG tablet Comments:   Reason for Stopping:         STATIN NOT PRESCRIBED, INTENTIONAL, Comments:   Reason for Stopping:         VENTOLIN  (90 Base) MCG/ACT inhaler Comments:   Reason for Stopping:             Allergies   Allergies   Allergen Reactions     Simvastatin      Muscle pain,

## 2019-05-13 LAB
BACTERIA SPEC CULT: NO GROWTH
BACTERIA SPEC CULT: NO GROWTH
Lab: NORMAL
Lab: NORMAL
SPECIMEN SOURCE: NORMAL
SPECIMEN SOURCE: NORMAL
